# Patient Record
Sex: FEMALE | Race: WHITE | Employment: OTHER | URBAN - METROPOLITAN AREA
[De-identification: names, ages, dates, MRNs, and addresses within clinical notes are randomized per-mention and may not be internally consistent; named-entity substitution may affect disease eponyms.]

---

## 2013-12-27 LAB — COLONOSCOPY, EXTERNAL: NORMAL

## 2017-03-07 ENCOUNTER — OFFICE VISIT (OUTPATIENT)
Dept: FAMILY MEDICINE CLINIC | Age: 57
End: 2017-03-07

## 2017-03-07 VITALS
BODY MASS INDEX: 35.56 KG/M2 | WEIGHT: 193.25 LBS | DIASTOLIC BLOOD PRESSURE: 82 MMHG | OXYGEN SATURATION: 96 % | HEIGHT: 62 IN | HEART RATE: 73 BPM | SYSTOLIC BLOOD PRESSURE: 122 MMHG | RESPIRATION RATE: 16 BRPM | TEMPERATURE: 98 F

## 2017-03-07 DIAGNOSIS — Z00.00 WELL ADULT EXAM: Primary | ICD-10-CM

## 2017-03-07 DIAGNOSIS — Z11.59 NEED FOR HEPATITIS C SCREENING TEST: ICD-10-CM

## 2017-03-07 DIAGNOSIS — Z91.09 POLLEN ALLERGIES: ICD-10-CM

## 2017-03-07 DIAGNOSIS — I10 ESSENTIAL HYPERTENSION: ICD-10-CM

## 2017-03-07 DIAGNOSIS — F32.A ANXIETY AND DEPRESSION: ICD-10-CM

## 2017-03-07 DIAGNOSIS — F41.9 ANXIETY AND DEPRESSION: ICD-10-CM

## 2017-03-07 RX ORDER — AMLODIPINE AND BENAZEPRIL HYDROCHLORIDE 10; 20 MG/1; MG/1
CAPSULE ORAL
Qty: 90 CAP | Refills: 3 | Status: SHIPPED | OUTPATIENT
Start: 2017-03-07 | End: 2017-10-17 | Stop reason: SINTOL

## 2017-03-07 RX ORDER — HYDROCHLOROTHIAZIDE 12.5 MG/1
CAPSULE ORAL
Qty: 90 CAP | Refills: 3 | Status: SHIPPED | OUTPATIENT
Start: 2017-03-07 | End: 2018-01-22 | Stop reason: SDUPTHER

## 2017-03-07 RX ORDER — ESCITALOPRAM OXALATE 20 MG/1
TABLET ORAL
Qty: 90 TAB | Refills: 3 | Status: SHIPPED | OUTPATIENT
Start: 2017-03-07 | End: 2018-01-22 | Stop reason: SDUPTHER

## 2017-03-07 RX ORDER — LEVOCETIRIZINE DIHYDROCHLORIDE 5 MG/1
5 TABLET, FILM COATED ORAL DAILY
Qty: 90 TAB | Refills: 3 | Status: SHIPPED | OUTPATIENT
Start: 2017-03-07 | End: 2019-10-17 | Stop reason: ALTCHOICE

## 2017-03-07 RX ORDER — CLONAZEPAM 0.5 MG/1
TABLET ORAL
Qty: 60 TAB | Refills: 2 | Status: SHIPPED | OUTPATIENT
Start: 2017-03-07 | End: 2017-10-17 | Stop reason: SDUPTHER

## 2017-03-07 NOTE — PROGRESS NOTES
Subjective:   61 y.o. female for Well Woman Check. Her gyne and breast care is done elsewhere by her Ob-Gyne physician. 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 90 Tab 3    clonazePAM (KLONOPIN) 0.5 mg tablet TAKE 1 TABLET BY MOUTH TWICE A DAY MX OF 1 MG PER DAY 60 Tab 2    MILK THISTLE PO Take  by mouth.  aspirin 81 mg chewable tablet Take 81 mg by mouth daily.  conj estrogens-bazedoxifene (DUAVEE) 0.45-20 mg tab Take 1 Tab by mouth daily. 90 Tab 3    multivitamin (ONE A DAY) tablet Take 1 Tab by mouth daily.  psyllium (METAMUCIL SMOOTH TEXTURE) 28 % packet Take  by mouth two (2) times a day.  omega-3 fatty acids-vitamin e (FISH OIL) 1,000 mg Cap Take  by mouth. Family History   Problem Relation Age of Onset    Heart Disease Mother     Heart Disease Paternal Grandmother     Heart Disease Paternal Grandfather      Social History   Substance Use Topics    Smoking status: Current Every Day Smoker     Packs/day: 0.50     Types: Cigarettes    Smokeless tobacco: Never Used    Alcohol use Yes             ROS: Feeling generally well. No TIA's or unusual headaches, no dysphagia. No prolonged cough. No dyspnea or chest pain on exertion. No abdominal pain, change in bowel habits, black or bloody stools. No urinary tract symptoms. No new or unusual musculoskeletal symptoms.     Specific concerns today: none, needs refills sent to mail order. Objective: The patient appears well, alert, oriented x 3, in no distress. Visit Vitals    /82    Pulse 73    Temp 98 °F (36.7 °C) (Oral)    Resp 16    Ht 5' 2\" (1.575 m)    Wt 193 lb 4 oz (87.7 kg)    LMP 07/12/2010    SpO2 96%    BMI 35.35 kg/m2     ENT normal.  Neck supple. No adenopathy or thyromegaly. ISMA. Lungs are clear, good air entry, no wheezes, rhonchi or rales. S1 and S2 normal, no murmurs, regular rate and rhythm. Abdomen soft without tenderness, guarding, mass or organomegaly. Extremities show no edema, normal peripheral pulses. Neurological is normal, no focal findings. Breast and Pelvic exams are deferred. Assessment/Plan:   Well Woman  lose weight, increase physical activity, follow low fat diet, follow low salt diet, routine labs ordered  Encounter Diagnoses   Name Primary?  Well adult exam Yes    Essential hypertension     Need for hepatitis C screening test     Pollen allergies     Anxiety and depression      Orders Placed This Encounter    CBC WITH AUTOMATED DIFF    LIPID PANEL    METABOLIC PANEL, COMPREHENSIVE    TSH 3RD GENERATION    HEPATITIS C AB    amLODIPine-benazepril (LOTREL) 10-20 mg per capsule    hydroCHLOROthiazide (MICROZIDE) 12.5 mg capsule    escitalopram oxalate (LEXAPRO) 20 mg tablet    levocetirizine (XYZAL) 5 mg tablet    clonazePAM (KLONOPIN) 0.5 mg tablet     Labs and refills updated  Recheck 6 mo  I have discussed the diagnosis with the patient and the intended plan as seen in the above orders. The patient has received an after-visit summary and questions were answered concerning future plans. Patient conveyed understanding of the plan at the time of the visit.     Carter Ventura, MSN, ANP  3/7/2017

## 2017-03-07 NOTE — MR AVS SNAPSHOT
Visit Information Date & Time Provider Department Dept. Phone Encounter #  
 3/7/2017  8:30 AM Lexy Aquino, NP [address] [facility] [phone] [id number] Upcoming Health Maintenance Date Due Hepatitis C Screening 1960 Pneumococcal 19-64 Medium Risk (1 of 1 - PPSV23) 11/25/1978 BREAST CANCER SCRN MAMMOGRAM 12/24/2015 INFLUENZA AGE 9 TO ADULT 8/1/2016 PAP AKA CERVICAL CYTOLOGY 11/3/2017 DTaP/Tdap/Td series (2 - Td) 1/20/2022 COLONOSCOPY 12/30/2023 Allergies as of 3/7/2017  Review Complete On: 3/7/2017 By: Merrick Chicas LPN Severity Noted Reaction Type Reactions Darvon [Propoxyphene]  04/26/2010    Palpitations Current Immunizations  Reviewed on 5/24/2016 Name Date Influenza Vaccine Marlys Goody) 10/7/2015 TDAP Vaccine 1/20/2012 Not reviewed this visit You Were Diagnosed With   
  
 Codes Comments Well adult exam    -  Primary ICD-10-CM: Z00.00 ICD-9-CM: V70.0 Essential hypertension     ICD-10-CM: I10 
ICD-9-CM: 401.9 Need for hepatitis C screening test     ICD-10-CM: Z11.59 
ICD-9-CM: V73.89 Pollen allergies     ICD-10-CM: J30.1 ICD-9-CM: 477.0 Anxiety and depression     ICD-10-CM: F41.9, F32.9 ICD-9-CM: 300.00, 311 Vitals BP Pulse Temp Resp Height(growth percentile) Weight(growth percentile) 122/82 73 98 °F (36.7 °C) (Oral) 16 5' 2\" (1.575 m) 193 lb 4 oz (87.7 kg) LMP SpO2 BMI OB Status Smoking Status 07/12/2010 96% 35.35 kg/m2 Postmenopausal Current Every Day Smoker Vitals History BMI and BSA Data Body Mass Index Body Surface Area  
 35.35 kg/m 2 1.96 m 2 Preferred Pharmacy Pharmacy Name Phone [address] [place] [address], [facility] [phone] Your Updated Medication List  
  
   
This list is accurate as of: 3/7/17  9:18 AM.  Always use your most recent med list.  
  
  
  
  
 amLODIPine-benazepril 10-20 mg per capsule Commonly known as:  LOTREL  
TAKE 1 CAPSULE DAILY  
  
 aspirin 81 mg chewable tablet Take 81 mg by mouth daily. clonazePAM 0.5 mg tablet Commonly known as:  KlonoPIN  
TAKE 1 TABLET BY MOUTH TWICE A DAY MX OF 1 MG PER DAY  
  
 conj estrogens-bazedoxifene 0.45-20 mg Tab Commonly known as:  Saint Lou Take 1 Tab by mouth daily. escitalopram oxalate 20 mg tablet Commonly known as:  Carol Tin TAKE 1 TABLET DAILY FISH OIL 1,000 mg Cap Generic drug:  omega-3 fatty acids-vitamin e Take  by mouth. hydroCHLOROthiazide 12.5 mg capsule Commonly known as:  King Shorts TAKE 1 CAPSULE DAILY  
  
 levocetirizine 5 mg tablet Commonly known as:  Graylon Derrick Take 1 Tab by mouth daily. METAMUCIL SMOOTH TEXTURE packet Generic drug:  psyllium Take  by mouth two (2) times a day. MILK THISTLE PO Take  by mouth.  
  
 multivitamin tablet Commonly known as:  ONE A DAY Take 1 Tab by mouth daily. Prescriptions Printed Refills  
 clonazePAM (KLONOPIN) 0.5 mg tablet 2 Sig: TAKE 1 TABLET BY MOUTH TWICE A DAY MX OF 1 MG PER DAY Class: Print Prescriptions Sent to Pharmacy Refills  
 amLODIPine-benazepril (LOTREL) 10-20 mg per capsule 3 Sig: TAKE 1 CAPSULE DAILY Class: Normal  
 Pharmacy: [address] [place] [address] Ph #: [phone]  
 hydroCHLOROthiazide (MICROZIDE) 12.5 mg capsule 3 Sig: TAKE 1 CAPSULE DAILY Class: Normal  
 Pharmacy: [address] [place] [address] Ph #: [phone]  
 escitalopram oxalate (LEXAPRO) 20 mg tablet 3 Sig: TAKE 1 TABLET DAILY Class: Normal  
 Pharmacy: [address] [place] [address] Ph #: [phone]  
 levocetirizine (XYZAL) 5 mg tablet 3 Sig: Take 1 Tab by mouth daily.   
 Class: Normal  
 Pharmacy: [id number]  #: [phone] Route: Oral  
  
We Performed the Following CBC WITH AUTOMATED DIFF [01798 CPT(R)] HEPATITIS C AB [47125 CPT(R)] LIPID PANEL [50355 CPT(R)] METABOLIC PANEL, COMPREHENSIVE [27169 CPT(R)] TSH 3RD GENERATION [53996 CPT(R)] Introducing \[facility] [place] [facility]\"" & [facility] SERVICES! Dear Radha Henry: 
Thank you for requesting a wali account. Our records indicate that you already have an active wali account. You can access your account anytime at [email or web address] Did you know that you can access your hospital and ER discharge instructions at any time in wali? You can also review all of your test results from your hospital stay or ER visit. Additional Information If you have questions, please visit the Frequently Asked Questions section of the wali website at [email or web address] Remember, wali is NOT to be used for urgent needs. For medical emergencies, dial 911. Now available from your iPhone and Android! Please provide this summary of care documentation to your next provider. Your primary care clinician is listed as Kvng Montgomery. If you have any questions after today's visit, please call [phone].

## 2017-03-07 NOTE — PROGRESS NOTES
1. Have you been to the ER, urgent care clinic since your last visit? Hospitalized since your last visit? No    2. Have you seen or consulted any other health care providers outside of the [facility] since your last visit? Include any pap smears or colon screening.  No    Chief Complaint   Patient presents with    Follow-up    Labs     fasting

## 2017-03-08 LAB
ALBUMIN SERPL-MCNC: 4.2 G/DL (ref 3.5–5.5)
ALBUMIN/GLOB SERPL: 1.6 {RATIO} (ref 1.1–2.5)
ALP SERPL-CCNC: 89 IU/L (ref 39–117)
ALT SERPL-CCNC: 62 IU/L (ref 0–32)
AST SERPL-CCNC: 48 IU/L (ref 0–40)
BASOPHILS # BLD AUTO: 0.1 X10E3/UL (ref 0–0.2)
BASOPHILS NFR BLD AUTO: 1 %
BILIRUB SERPL-MCNC: 0.2 MG/DL (ref 0–1.2)
BUN SERPL-MCNC: 11 MG/DL (ref 6–24)
BUN/CREAT SERPL: 21 (ref 9–23)
CALCIUM SERPL-MCNC: 8.9 MG/DL (ref 8.7–10.2)
CHLORIDE SERPL-SCNC: 101 MMOL/L (ref 96–106)
CHOLEST SERPL-MCNC: 192 MG/DL (ref 100–199)
CO2 SERPL-SCNC: 25 MMOL/L (ref 18–29)
CREAT SERPL-MCNC: 0.52 MG/DL (ref 0.57–1)
EOSINOPHIL # BLD AUTO: 0.2 X10E3/UL (ref 0–0.4)
EOSINOPHIL NFR BLD AUTO: 2 %
ERYTHROCYTE [DISTWIDTH] IN BLOOD BY AUTOMATED COUNT: 12.9 % (ref 12.3–15.4)
GLOBULIN SER CALC-MCNC: 2.6 G/DL (ref 1.5–4.5)
GLUCOSE SERPL-MCNC: 92 MG/DL (ref 65–99)
HCT VFR BLD AUTO: 40.7 % (ref 34–46.6)
HCV AB S/CO SERPL IA: <0.1 S/CO RATIO (ref 0–0.9)
HDLC SERPL-MCNC: 70 MG/DL
HGB BLD-MCNC: 13.8 G/DL (ref 11.1–15.9)
IMM GRANULOCYTES # BLD: 0 X10E3/UL (ref 0–0.1)
IMM GRANULOCYTES NFR BLD: 0 %
INTERPRETATION, 910389: NORMAL
LDLC SERPL CALC-MCNC: 106 MG/DL (ref 0–99)
LYMPHOCYTES # BLD AUTO: 3 X10E3/UL (ref 0.7–3.1)
LYMPHOCYTES NFR BLD AUTO: 39 %
MCH RBC QN AUTO: 33.3 PG (ref 26.6–33)
MCHC RBC AUTO-ENTMCNC: 33.9 G/DL (ref 31.5–35.7)
MCV RBC AUTO: 98 FL (ref 79–97)
MONOCYTES # BLD AUTO: 0.6 X10E3/UL (ref 0.1–0.9)
MONOCYTES NFR BLD AUTO: 8 %
NEUTROPHILS # BLD AUTO: 3.9 X10E3/UL (ref 1.4–7)
NEUTROPHILS NFR BLD AUTO: 50 %
PLATELET # BLD AUTO: 374 X10E3/UL (ref 150–379)
POTASSIUM SERPL-SCNC: 4.3 MMOL/L (ref 3.5–5.2)
PROT SERPL-MCNC: 6.8 G/DL (ref 6–8.5)
RBC # BLD AUTO: 4.14 X10E6/UL (ref 3.77–5.28)
SODIUM SERPL-SCNC: 140 MMOL/L (ref 134–144)
TRIGL SERPL-MCNC: 82 MG/DL (ref 0–149)
TSH SERPL DL<=0.005 MIU/L-ACNC: 2.23 UIU/ML (ref 0.45–4.5)
VLDLC SERPL CALC-MCNC: 16 MG/DL (ref 5–40)
WBC # BLD AUTO: 7.8 X10E3/UL (ref 3.4–10.8)

## 2017-03-09 RX ORDER — MOMETASONE FUROATE 50 UG/1
SPRAY, METERED NASAL
Qty: 1 CONTAINER | Refills: 5 | Status: SHIPPED | OUTPATIENT
Start: 2017-03-09 | End: 2017-08-09 | Stop reason: ALTCHOICE

## 2017-03-09 NOTE — PROGRESS NOTES
Hey there, your labs overall look great, thyroid and cholesterol are in good range. Your liver functions are slightly high, watch the tylenol and alcohol intake.   We will recheck this in 6 months fasting, Eleni

## 2017-05-24 ENCOUNTER — OFFICE VISIT (OUTPATIENT)
Dept: FAMILY MEDICINE CLINIC | Age: 57
End: 2017-05-24

## 2017-05-24 VITALS
DIASTOLIC BLOOD PRESSURE: 74 MMHG | HEIGHT: 62 IN | BODY MASS INDEX: 35.55 KG/M2 | WEIGHT: 193.2 LBS | RESPIRATION RATE: 19 BRPM | HEART RATE: 75 BPM | TEMPERATURE: 98.5 F | SYSTOLIC BLOOD PRESSURE: 137 MMHG | OXYGEN SATURATION: 98 %

## 2017-05-24 DIAGNOSIS — R79.89 ELEVATED LFTS: ICD-10-CM

## 2017-05-24 DIAGNOSIS — F10.10 ALCOHOL ABUSE, DAILY USE: Primary | ICD-10-CM

## 2017-05-24 NOTE — PROGRESS NOTES
Chief Complaint   Patient presents with    Labs    Fatty Liver    Alcohol Problem     Patient seen in office today tearful with spouse present for discussion regarding her drinking addiction and concerns with her fatty liver. Pt is drinking 6-10 beers per day. Subjective: (As above and below)     Chief Complaint   Patient presents with    Labs    Fatty Liver    Alcohol Problem     she is a 61y.o. year old female who presents for evaluation. Reviewed PmHx, RxHx, FmHx, SocHx, AllgHx and updated in chart. Review of Systems - negative except as listed above    Objective:     Vitals:    05/24/17 1111   BP: 137/74   Pulse: 75   Resp: 19   Temp: 98.5 °F (36.9 °C)   TempSrc: Oral   SpO2: 98%   Weight: 193 lb 3.2 oz (87.6 kg)   Height: 5' 2\" (1.575 m)     Physical Examination: General appearance - alert, well appearing, and in no distress  Mental status - normal mood, behavior, speech, dress, motor activity, and thought processes  Mouth - mucous membranes moist, pharynx normal without lesions  Chest - clear to auscultation, no wheezes, rales or rhonchi, symmetric air entry  Heart - normal rate, regular rhythm, normal S1, S2, no murmurs, rubs, clicks or gallops  Musculoskeletal - no joint tenderness, deformity or swelling  Extremities - peripheral pulses normal, no pedal edema, no clubbing or cyanosis    Assessment/ Plan:   1. Alcohol abuse, daily use  -discussed need to cut back or eliminate alcohol use    2. Elevated LFTs  -continue to monitor periodically     Follow-up Disposition: As needed  I have discussed the diagnosis with the patient and the intended plan as seen in the above orders. The patient has received an after-visit summary and questions were answered concerning future plans.      Medication Side Effects and Warnings were discussed with patient: yes  Patient Labs were reviewed: yes  Patient Past Records were reviewed:  yes    Elvin Lima M.D.

## 2017-05-24 NOTE — MR AVS SNAPSHOT
Visit Information Date & Time Provider Department Dept. Phone Encounter #  
 5/24/2017 11:10 AM Jennifer Toledo MD [address] [facility] [phone] [id number] Upcoming Health Maintenance Date Due Pneumococcal 19-64 Medium Risk (1 of 1 - PPSV23) 11/25/1978 INFLUENZA AGE 9 TO ADULT 8/1/2017 BREAST CANCER SCRN MAMMOGRAM 11/1/2018 PAP AKA CERVICAL CYTOLOGY 11/1/2019 DTaP/Tdap/Td series (2 - Td) 1/20/2022 COLONOSCOPY 12/30/2023 Allergies as of 5/24/2017  Review Complete On: 5/24/2017 By: Jennifer Toledo MD  
  
 Severity Noted Reaction Type Reactions Darvon [Propoxyphene]  04/26/2010    Palpitations Current Immunizations  Reviewed on 5/24/2016 Name Date Influenza Vaccine Maynor Bliss) 10/7/2015 TDAP Vaccine 1/20/2012 Not reviewed this visit You Were Diagnosed With   
  
 Codes Comments Alcohol abuse, daily use    -  Primary ICD-10-CM: F10.10 ICD-9-CM: 305.01 Elevated LFTs     ICD-10-CM: R94.5 ICD-9-CM: 790.6 Vitals BP Pulse Temp Resp Height(growth percentile) Weight(growth percentile)  
 137/74 (BP 1 Location: Right arm, BP Patient Position: Sitting) 75 98.5 °F (36.9 °C) (Oral) 19 5' 2\" (1.575 m) 193 lb 3.2 oz (87.6 kg) LMP SpO2 BMI OB Status Smoking Status 07/12/2010 98% 35.34 kg/m2 Postmenopausal Current Every Day Smoker Vitals History BMI and BSA Data Body Mass Index Body Surface Area  
 35.34 kg/m 2 1.96 m 2 [id number] [phone] Your Updated Medication List  
  
   
This list is accurate as of: 5/24/17 11:38 AM.  Always use your most recent med list. amLODIPine-benazepril 10-20 mg per capsule Commonly known as:  LOTREL  
TAKE 1 CAPSULE DAILY  
  
 aspirin 81 mg chewable tablet Take 81 mg by mouth daily. clonazePAM 0.5 mg tablet Commonly known as:  KlonoPIN  
TAKE 1 TABLET BY MOUTH TWICE A DAY MX OF 1 MG PER DAY  
  
 conj estrogens-bazedoxifene 0.45-20 mg Tab Commonly known as:  Camella Cindy Take 1 Tab by mouth daily. escitalopram oxalate 20 mg tablet Commonly known as:  Lelon Bible TAKE 1 TABLET DAILY FISH OIL 1,000 mg Cap Generic drug:  omega-3 fatty acids-vitamin e Take  by mouth. hydroCHLOROthiazide 12.5 mg capsule Commonly known as:  Mellette Pollen TAKE 1 CAPSULE DAILY  
  
 levocetirizine 5 mg tablet Commonly known as:  Johanny Erichsen Take 1 Tab by mouth daily. METAMUCIL SMOOTH TEXTURE packet Generic drug:  psyllium Take  by mouth two (2) times a day. MILK THISTLE PO Take  by mouth.  
  
 mometasone 50 mcg/actuation nasal spray Commonly known as:  NASONEX  
USE 2 SPRAYS IN EACH NOSTRIL DAILY  
  
 multivitamin tablet Commonly known as:  ONE A DAY Take 1 Tab by mouth daily. Introducing [facility] & HEALTH SERVICES! Dear Cristofer Oscar: 
Thank you for requesting a BurudaConcert account. Our records indicate that you already have an active BurudaConcert account. You can access your account anytime at [email or web address] Security Did you know that you can access your hospital and ER discharge instructions at any time in BurudaConcert? You can also review all of your test results from your hospital stay or ER visit. Additional Information If you have questions, please visit the Frequently Asked Questions section of the BurudaConcert website at [email or web address] Remember, BurudaConcert is NOT to be used for urgent needs. For medical emergencies, dial 911. Now available from your iPhone and Android! Please provide this summary of care documentation to your next provider. Your primary care clinician is listed as Sierra Peterson. If you have any questions after today's visit, please call [phone].

## 2017-05-24 NOTE — PROGRESS NOTES
Chief Complaint   Patient presents with    Labs    Fatty Liver    Alcohol Problem     Patient seen in office today tearful with spouse present for discussion regarding her drinking addiction and concerns with her fatty liver.

## 2017-08-09 ENCOUNTER — OFFICE VISIT (OUTPATIENT)
Dept: FAMILY MEDICINE CLINIC | Age: 57
End: 2017-08-09

## 2017-08-09 VITALS
SYSTOLIC BLOOD PRESSURE: 132 MMHG | TEMPERATURE: 98.2 F | HEIGHT: 62 IN | HEART RATE: 76 BPM | DIASTOLIC BLOOD PRESSURE: 83 MMHG | RESPIRATION RATE: 18 BRPM | BODY MASS INDEX: 36.25 KG/M2 | WEIGHT: 197 LBS | OXYGEN SATURATION: 98 %

## 2017-08-09 DIAGNOSIS — M54.2 NECK PAIN: ICD-10-CM

## 2017-08-09 DIAGNOSIS — J06.9 UPPER RESPIRATORY TRACT INFECTION, UNSPECIFIED TYPE: Primary | ICD-10-CM

## 2017-08-09 DIAGNOSIS — M25.551 RIGHT HIP PAIN: ICD-10-CM

## 2017-08-09 DIAGNOSIS — R35.0 URINARY FREQUENCY: ICD-10-CM

## 2017-08-09 LAB
BILIRUB UR QL STRIP: NEGATIVE
GLUCOSE UR-MCNC: NEGATIVE MG/DL
KETONES P FAST UR STRIP-MCNC: NEGATIVE MG/DL
PH UR STRIP: 6.5 [PH] (ref 4.6–8)
PROT UR QL STRIP: NEGATIVE MG/DL
SP GR UR STRIP: 1.01 (ref 1–1.03)
UA UROBILINOGEN AMB POC: NORMAL (ref 0.2–1)
URINALYSIS CLARITY POC: CLEAR
URINALYSIS COLOR POC: YELLOW
URINE BLOOD POC: NEGATIVE
URINE LEUKOCYTES POC: NEGATIVE
URINE NITRITES POC: NEGATIVE

## 2017-08-09 RX ORDER — PREDNISONE 10 MG/1
TABLET ORAL
Qty: 21 TAB | Refills: 0 | Status: SHIPPED | OUTPATIENT
Start: 2017-08-09 | End: 2018-10-01 | Stop reason: ALTCHOICE

## 2017-08-09 RX ORDER — BACLOFEN 10 MG/1
10 TABLET ORAL 3 TIMES DAILY
Qty: 90 TAB | Refills: 1 | Status: SHIPPED | OUTPATIENT
Start: 2017-08-09 | End: 2017-10-26 | Stop reason: SDUPTHER

## 2017-08-09 NOTE — PROGRESS NOTES
Pt here c/o ongoing pain in left side of neck and right hip x several years. Reports having difficulty sleeping due to pain. Also c/o cough x 3-4 weeks. States that cough has been productive with thick mucus.

## 2017-08-09 NOTE — PROGRESS NOTES
Pt here c/o ongoing pain in left side of neck and right hip x several years. Reports having difficulty sleeping due to pain. Also c/o cough x 3-4 weeks. States that cough has been productive with thick mucus. Pt reports that her neck has been a chronic issue. Pt reports that her hip has been bothering her more than usual, can not sleep on that side, can not stand for long periods of time. Pt has been taking Advil with some relief. Subjective: (As above and below)     Chief Complaint   Patient presents with    Shoulder Pain    Neck Pain    Cough     she is a 61y.o. year old female who presents for evaluation. Reviewed PmHx, RxHx, FmHx, SocHx, AllgHx and updated in chart. Review of Systems - negative except as listed above    Objective:     Vitals:    08/09/17 0753   BP: 132/83   Pulse: 76   Resp: 18   Temp: 98.2 °F (36.8 °C)   TempSrc: Oral   SpO2: 98%   Weight: 197 lb (89.4 kg)   Height: 5' 2\" (1.575 m)     Physical Examination: General appearance - alert, well appearing, and in no distress  Mental status - normal mood, behavior, speech, dress, motor activity, and thought processes  Eyes - pupils equal and reactive, extraocular eye movements intact  Mouth - mucous membranes moist, pharynx normal without lesions  Chest - clear to auscultation, no wheezes, rales or rhonchi, symmetric air entry  Heart - normal rate, regular rhythm, normal S1, S2, no murmurs, rubs, clicks or gallops  Musculoskeletal - left trapezius spasm with tenderness  -right hip pain from SI notch to lateral right hip, pain with abduction    Assessment/ Plan:   1. Upper respiratory tract infection, unspecified type  -prednisone as written, mucinex or delsym to help with symptoms    2. Right hip pain  -take medication as written, check x-rays  - XR HIP RT W OR WO PELV 2-3 VWS; Future  - predniSONE (STERAPRED DS) 10 mg dose pack; See administration instruction per 10mg dose pack  Dispense: 21 Tab;  Refill: 0  - baclofen (LIORESAL) 10 mg tablet; Take 1 Tab by mouth three (3) times daily. Dispense: 90 Tab; Refill: 1    3. Neck pain  - XR SPINE CERV 4 OR 5 V; Future     4. Urinary frequency  -UA WNL    Follow-up Disposition: As needed  I have discussed the diagnosis with the patient and the intended plan as seen in the above orders. The patient has received an after-visit summary and questions were answered concerning future plans.      Medication Side Effects and Warnings were discussed with patient: yes  Patient Labs were reviewed: yes  Patient Past Records were reviewed:  yes    Belem Villarreal M.D.

## 2017-08-09 NOTE — MR AVS SNAPSHOT
Visit Information Date & Time Provider Department Dept. Phone Encounter #  
 8/9/2017  7:45 AM Mylene Ramos MD [address] [facility] [phone] [id number] Upcoming Health Maintenance Date Due Pneumococcal 19-64 Medium Risk (1 of 1 - PPSV23) 11/25/1979 INFLUENZA AGE 9 TO ADULT 8/1/2017 BREAST CANCER SCRN MAMMOGRAM 11/1/2018 PAP AKA CERVICAL CYTOLOGY 11/1/2019 DTaP/Tdap/Td series (2 - Td) 1/20/2022 COLONOSCOPY 12/30/2023 Allergies as of 8/9/2017  Review Complete On: 8/9/2017 By: Mylene Ramos MD  
  
 Severity Noted Reaction Type Reactions Darvon [Propoxyphene]  04/26/2010    Palpitations Current Immunizations  Reviewed on 5/24/2016 Name Date Influenza Vaccine Jesús Cm) 10/7/2015 TDAP Vaccine 1/20/2012 Not reviewed this visit You Were Diagnosed With   
  
 Codes Comments Upper respiratory tract infection, unspecified type    -  Primary ICD-10-CM: J06.9 ICD-9-CM: 465.9 Right hip pain     ICD-10-CM: M25.551 ICD-9-CM: 719.45 Neck pain     ICD-10-CM: M54.2 ICD-9-CM: 723.1 Urinary frequency     ICD-10-CM: R35.0 ICD-9-CM: 788.41 Vitals BP Pulse Temp Resp Height(growth percentile) Weight(growth percentile) 132/83 (BP 1 Location: Left arm, BP Patient Position: Sitting) 76 98.2 °F (36.8 °C) (Oral) 18 5' 2\" (1.575 m) 197 lb (89.4 kg) LMP SpO2 BMI OB Status Smoking Status 07/12/2010 98% 36.03 kg/m2 Postmenopausal Current Every Day Smoker Vitals History BMI and BSA Data Body Mass Index Body Surface Area 36.03 kg/m 2 1.98 m 2 Preferred Pharmacy [facility] Name Phone [id number], [address] [facility] [phone] Your Updated Medication List  
  
   
This list is accurate as of: 8/9/17  8:45 AM.  Always use your most recent med list. amLODIPine-benazepril 10-20 mg per capsule Commonly known as:  Iam Sill  
 TAKE 1 CAPSULE DAILY  
  
 aspirin 81 mg chewable tablet Take 81 mg by mouth daily. baclofen 10 mg tablet Commonly known as:  LIORESAL Take 1 Tab by mouth three (3) times daily. clonazePAM 0.5 mg tablet Commonly known as:  KlonoPIN  
TAKE 1 TABLET BY MOUTH TWICE A DAY MX OF 1 MG PER DAY  
  
 conj estrogens-bazedoxifene 0.45-20 mg Tab Commonly known as:  Adore Sins Take 1 Tab by mouth daily. escitalopram oxalate 20 mg tablet Commonly known as:  Cassandra Li TAKE 1 TABLET DAILY FISH OIL 1,000 mg Cap Generic drug:  omega-3 fatty acids-vitamin e Take  by mouth. hydroCHLOROthiazide 12.5 mg capsule Commonly known as:  Lindsey Harpreet TAKE 1 CAPSULE DAILY  
  
 levocetirizine 5 mg tablet Commonly known as:  Kaushik Jarad Take 1 Tab by mouth daily. METAMUCIL SMOOTH TEXTURE packet Generic drug:  psyllium Take  by mouth two (2) times a day. MILK THISTLE PO Take  by mouth.  
  
 multivitamin tablet Commonly known as:  ONE A DAY Take 1 Tab by mouth daily. predniSONE 10 mg dose pack Commonly known as:  STERAPRED DS See administration instruction per 10mg dose pack Prescriptions Sent to Pharmacy Refills  
 predniSONE (STERAPRED DS) 10 mg dose pack 0 Sig: See administration instruction per 10mg dose pack Class: Normal  
 Pharmacy: [facility] [address] [place] [address] Ph #: [phone]  
 baclofen (LIORESAL) 10 mg tablet 1 Sig: Take 1 Tab by mouth three (3) times daily. Class: Normal  
 Pharmacy: [facility] [address] Ph #: [phone] Route: Oral  
  
We Performed the Following AMB POC URINALYSIS DIP STICK AUTO W/O MICRO [37974 CPT(R)] To-Do List   
 08/09/2017 Imaging:  XR HIP RT W OR WO PELV 2-3 VWS   
  
 08/09/2017 Imaging:  XR SPINE CERV 4 OR 5 V [facility]! Dear David Dos Santos: Thank you for requesting a ReClaims account. Our records indicate that you already have an active ReClaims account. You can access your account anytime at [email or web address] [email or web address] Did you know that you can access your hospital and ER discharge instructions at any time in ReClaims? You can also review all of your test results from your hospital stay or ER visit. Additional Information If you have questions, please visit the Frequently Asked Questions section of the ReClaims website at [email or web address] Remember, ReClaims is NOT to be used for urgent needs. For medical emergencies, dial 911. Now available from your iPhone and Android! Please provide this summary of care documentation to your next provider. Your primary care clinician is listed as Vianey Sanchez. If you have any questions after today's visit, please call [phone].

## 2017-08-16 ENCOUNTER — HOSPITAL ENCOUNTER (OUTPATIENT)
Dept: GENERAL RADIOLOGY | Age: 57
Discharge: HOME | End: 2017-08-16
Attending: FAMILY MEDICINE
Payer: COMMERCIAL

## 2017-08-16 DIAGNOSIS — M54.2 NECK PAIN: ICD-10-CM

## 2017-08-16 DIAGNOSIS — M25.551 RIGHT HIP PAIN: ICD-10-CM

## 2017-08-16 PROCEDURE — 72050 X-RAY EXAM NECK SPINE 4/5VWS: CPT

## 2017-08-16 PROCEDURE — 73502 X-RAY EXAM HIP UNI 2-3 VIEWS: CPT

## 2017-08-16 NOTE — PROGRESS NOTES
Patient informed of results and providers recommendations. Patient declined suggest referral, [facility]  Patient informed to please call back with time and date of appointment and if referral is needed

## 2017-08-16 NOTE — PROGRESS NOTES
Mild to moderate arthritis, recommend PT to help with symptoms.    Please inform  Refer to Betty Bangura if a referral is needed

## 2017-10-17 ENCOUNTER — OFFICE VISIT (OUTPATIENT)
Dept: FAMILY MEDICINE CLINIC | Age: 57
End: 2017-10-17

## 2017-10-17 VITALS
SYSTOLIC BLOOD PRESSURE: 142 MMHG | TEMPERATURE: 98.8 F | HEIGHT: 62 IN | BODY MASS INDEX: 36.29 KG/M2 | WEIGHT: 197.2 LBS | DIASTOLIC BLOOD PRESSURE: 78 MMHG | HEART RATE: 73 BPM | OXYGEN SATURATION: 94 % | RESPIRATION RATE: 18 BRPM

## 2017-10-17 DIAGNOSIS — M25.551 RIGHT HIP PAIN: ICD-10-CM

## 2017-10-17 DIAGNOSIS — I10 ESSENTIAL HYPERTENSION: Primary | ICD-10-CM

## 2017-10-17 DIAGNOSIS — F41.9 ANXIETY AND DEPRESSION: ICD-10-CM

## 2017-10-17 DIAGNOSIS — R05.8 DRY COUGH: ICD-10-CM

## 2017-10-17 DIAGNOSIS — F32.A ANXIETY AND DEPRESSION: ICD-10-CM

## 2017-10-17 RX ORDER — CLONAZEPAM 0.5 MG/1
TABLET ORAL
Qty: 60 TAB | Refills: 2 | Status: SHIPPED | OUTPATIENT
Start: 2017-10-17 | End: 2018-05-29 | Stop reason: SDUPTHER

## 2017-10-17 RX ORDER — AMLODIPINE AND VALSARTAN 10; 320 MG/1; MG/1
1 TABLET ORAL DAILY
Qty: 90 TAB | Refills: 3 | Status: SHIPPED | OUTPATIENT
Start: 2017-10-17 | End: 2018-10-12 | Stop reason: SDUPTHER

## 2017-10-17 NOTE — PROGRESS NOTES
Chief Complaint   Patient presents with    Hip Pain     5/10     Patient seen in the office today with spouse for unresolved hip pain 5/10  Pt had not started PT due to confusion re: referral.     Subjective: (As above and below)     Chief Complaint   Patient presents with    Hip Pain     5/10     she is a 61y.o. year old female who presents for evaluation. Reviewed PmHx, RxHx, FmHx, SocHx, AllgHx and updated in chart. Review of Systems - negative except as listed above    Objective:     Vitals:    10/17/17 0942   BP: 142/78   Pulse: 73   Resp: 18   Temp: 98.8 °F (37.1 °C)   TempSrc: Oral   SpO2: 94%   Weight: 197 lb 3.2 oz (89.4 kg)   Height: 5' 2\" (1.575 m)     Physical Examination: General appearance - alert, well appearing, and in no distress  Mental status - normal mood, behavior, speech, dress, motor activity, and thought processes  Mouth - mucous membranes moist, pharynx normal without lesions  Chest - clear to auscultation, no wheezes, rales or rhonchi, symmetric air entry  Heart - normal rate, regular rhythm, normal S1, S2, no murmurs, rubs, clicks or gallops  Musculoskeletal - right hip pain with ad/abduction  Extremities - peripheral pulses normal, no pedal edema, no clubbing or cyanosis    Assessment/ Plan:   1. Essential hypertension  -change due to dry cough  - amLODIPine-valsartan (EXFORGE)  mg per tablet; Take 1 Tab by mouth daily. Dispense: 90 Tab; Refill: 3    2. Right hip pain  - REFERRAL TO PHYSICAL THERAPY    3. Dry cough  - amLODIPine-valsartan (EXFORGE)  mg per tablet; Take 1 Tab by mouth daily. Dispense: 90 Tab; Refill: 3    4. Anxiety and depression  -well controlled, refilled x 3 motnhs  - clonazePAM (KLONOPIN) 0.5 mg tablet; TAKE 1 TABLET BY MOUTH TWICE A DAY MX OF 1 MG PER DAY  Dispense: 60 Tab; Refill: 2     Follow-up Disposition: As needed  I have discussed the diagnosis with the patient and the intended plan as seen in the above orders.   The patient has received an after-visit summary and questions were answered concerning future plans.      Medication Side Effects and Warnings were discussed with patient: yes  Patient Labs were reviewed: yes  Patient Past Records were reviewed:  yes    Sushil Wetzel M.D.

## 2017-10-17 NOTE — PROGRESS NOTES
Chief Complaint   Patient presents with    Hip Pain     5/10     Patient seen in the office today with spouse for unresolved hip pain 5/10

## 2017-10-17 NOTE — MR AVS SNAPSHOT
Visit Information Date & Time Provider Department Dept. Phone Encounter #  
 10/17/2017  9:35 AM Juice Martines MD [address] [facility] [phone] [id number] Upcoming Health Maintenance Date Due Pneumococcal 19-64 Medium Risk (1 of 1 - PPSV23) 11/25/1978 INFLUENZA AGE 9 TO ADULT 8/1/2017 BREAST CANCER SCRN MAMMOGRAM 11/1/2018 PAP AKA CERVICAL CYTOLOGY 11/1/2019 DTaP/Tdap/Td series (2 - Td) 1/20/2022 COLONOSCOPY 12/30/2023 Allergies as of 10/17/2017  Review Complete On: 10/17/2017 By: Juice Martines MD  
  
 Severity Noted Reaction Type Reactions Darvon [Propoxyphene]  04/26/2010    Palpitations Current Immunizations  Reviewed on 5/24/2016 Name Date Influenza Vaccine Carlton Pasha) 10/7/2015 TDAP Vaccine 1/20/2012 Not reviewed this visit You Were Diagnosed With   
  
 Codes Comments Essential hypertension    -  Primary ICD-10-CM: I10 
ICD-9-CM: 401.9 Right hip pain     ICD-10-CM: M25.551 ICD-9-CM: 719.45 Dry cough     ICD-10-CM: R05 ICD-9-CM: 786.2 Anxiety and depression     ICD-10-CM: F41.8 ICD-9-CM: 300.00, 311 Vitals BP Pulse Temp Resp Height(growth percentile) Weight(growth percentile) 142/78 (BP 1 Location: Left arm, BP Patient Position: Sitting) 73 98.8 °F (37.1 °C) (Oral) 18 5' 2\" (1.575 m) 197 lb 3.2 oz (89.4 kg) LMP SpO2 BMI OB Status Smoking Status 07/12/2010 94% 36.07 kg/m2 Postmenopausal Current Every Day Smoker Vitals History BMI and BSA Data Body Mass Index Body Surface Area 36.07 kg/m 2 1.98 m 2 Preferred Pharmacy Pharmacy Name Phone [id number], [address] [facility] [phone] Your Updated Medication List  
  
   
This list is accurate as of: 10/17/17 10:26 AM.  Always use your most recent med list. amLODIPine-valsartan  mg per tablet Commonly known as:  Melissa Waller  
 Take 1 Tab by mouth daily. aspirin 81 mg chewable tablet Take 81 mg by mouth daily. baclofen 10 mg tablet Commonly known as:  LIORESAL Take 1 Tab by mouth three (3) times daily. clonazePAM 0.5 mg tablet Commonly known as:  KlonoPIN  
TAKE 1 TABLET BY MOUTH TWICE A DAY MX OF 1 MG PER DAY  
  
 conj estrogens-bazedoxifene 0.45-20 mg Tab Commonly known as:  Hillsdale Shaunna Take 1 Tab by mouth daily. escitalopram oxalate 20 mg tablet Commonly known as:  Jaime Cabrera TAKE 1 TABLET DAILY FISH OIL 1,000 mg Cap Generic drug:  omega-3 fatty acids-vitamin e Take  by mouth. hydroCHLOROthiazide 12.5 mg capsule Commonly known as:  Danyelle Coalfield TAKE 1 CAPSULE DAILY  
  
 levocetirizine 5 mg tablet Commonly known as:  Arleth Gull Take 1 Tab by mouth daily. METAMUCIL SMOOTH TEXTURE packet Generic drug:  psyllium Take  by mouth two (2) times a day. MILK THISTLE PO Take  by mouth.  
  
 multivitamin tablet Commonly known as:  ONE A DAY Take 1 Tab by mouth daily. predniSONE 10 mg dose pack Commonly known as:  STERAPRED DS See administration instruction per 10mg dose pack Prescriptions Printed Refills  
 clonazePAM (KLONOPIN) 0.5 mg tablet 2 Sig: TAKE 1 TABLET BY MOUTH TWICE A DAY MX OF 1 MG PER DAY Class: Print Prescriptions Sent to Pharmacy Refills  
 amLODIPine-valsartan (EXFORGE)  mg per tablet 3 Sig: Take 1 Tab by mouth daily. Class: Normal  
 Pharmacy: [facility] [address], [address] Ph #: [phone] Route: Oral  
  
We Performed the Following REFERRAL TO PHYSICAL THERAPY [[facility]14 Custom] Comments:  
 Please evaluate and treat for hip pain Referral Information Referral ID Referred By Referred To  
  
 [id number] Pooja BALDWIN Not Available Visits Status Start Date End Date 1 New Request 10/17/17 10/17/18 If your referral has a status of pending review or denied, additional information will be sent to support the outcome of this decision. Introducing [facility] SERVICES! Dear Hollie Davalos: 
Thank you for requesting a GBooking account. Our records indicate that you already have an active GBooking account. You can access your account anytime at [email or web address] Did you know that you can access your hospital and ER discharge instructions at any time in GBooking? You can also review all of your test results from your hospital stay or ER visit. Additional Information If you have questions, please visit the Frequently Asked Questions section of the GBooking website at [email or web address] Remember, GBooking is NOT to be used for urgent needs. For medical emergencies, dial 911. Now available from your iPhone and Android! Please provide this summary of care documentation to your next provider. Your primary care clinician is listed as Erick Guzman. If you have any questions after today's visit, please call [phone].

## 2017-10-26 DIAGNOSIS — M25.551 RIGHT HIP PAIN: ICD-10-CM

## 2017-10-27 RX ORDER — BACLOFEN 10 MG/1
TABLET ORAL
Qty: 90 TAB | Refills: 1 | Status: SHIPPED | OUTPATIENT
Start: 2017-10-27 | End: 2018-01-06 | Stop reason: SDUPTHER

## 2018-01-06 DIAGNOSIS — M25.551 RIGHT HIP PAIN: ICD-10-CM

## 2018-01-07 RX ORDER — BACLOFEN 10 MG/1
TABLET ORAL
Qty: 90 TAB | Refills: 1 | Status: SHIPPED | OUTPATIENT
Start: 2018-01-07 | End: 2018-05-17 | Stop reason: SDUPTHER

## 2018-01-22 RX ORDER — HYDROCHLOROTHIAZIDE 12.5 MG/1
CAPSULE ORAL
Qty: 90 CAP | Refills: 2 | Status: SHIPPED | OUTPATIENT
Start: 2018-01-22 | End: 2018-12-31 | Stop reason: SDUPTHER

## 2018-01-22 RX ORDER — ESCITALOPRAM OXALATE 20 MG/1
TABLET ORAL
Qty: 90 TAB | Refills: 2 | Status: SHIPPED | OUTPATIENT
Start: 2018-01-22 | End: 2018-12-31 | Stop reason: SDUPTHER

## 2018-05-17 DIAGNOSIS — M25.551 RIGHT HIP PAIN: ICD-10-CM

## 2018-05-17 RX ORDER — BACLOFEN 10 MG/1
TABLET ORAL
Qty: 90 TAB | Refills: 1 | Status: SHIPPED | OUTPATIENT
Start: 2018-05-17 | End: 2018-06-15 | Stop reason: SDUPTHER

## 2018-05-29 ENCOUNTER — OFFICE VISIT (OUTPATIENT)
Dept: FAMILY MEDICINE CLINIC | Age: 58
End: 2018-05-29

## 2018-05-29 VITALS
TEMPERATURE: 98.9 F | RESPIRATION RATE: 16 BRPM | OXYGEN SATURATION: 98 % | DIASTOLIC BLOOD PRESSURE: 70 MMHG | BODY MASS INDEX: 36.83 KG/M2 | HEIGHT: 62 IN | WEIGHT: 200.13 LBS | SYSTOLIC BLOOD PRESSURE: 110 MMHG | HEART RATE: 79 BPM

## 2018-05-29 DIAGNOSIS — H04.301 INFECTION OF RIGHT TEAR DUCT: ICD-10-CM

## 2018-05-29 DIAGNOSIS — F32.A ANXIETY AND DEPRESSION: ICD-10-CM

## 2018-05-29 DIAGNOSIS — B35.4 TINEA CORPORIS: ICD-10-CM

## 2018-05-29 DIAGNOSIS — F41.9 ANXIETY AND DEPRESSION: ICD-10-CM

## 2018-05-29 DIAGNOSIS — I10 ESSENTIAL HYPERTENSION: ICD-10-CM

## 2018-05-29 DIAGNOSIS — N39.3 FEMALE STRESS INCONTINENCE: ICD-10-CM

## 2018-05-29 DIAGNOSIS — G57.62 MORTON'S NEUROMA OF LEFT FOOT: ICD-10-CM

## 2018-05-29 DIAGNOSIS — Z00.00 WELL ADULT EXAM: Primary | ICD-10-CM

## 2018-05-29 RX ORDER — ESTRADIOL AND NORETHINDRONE ACETATE 1; .5 MG/1; MG/1
TABLET, FILM COATED ORAL
Refills: 1 | COMMUNITY
Start: 2018-04-03 | End: 2019-10-17 | Stop reason: ALTCHOICE

## 2018-05-29 RX ORDER — NYSTATIN AND TRIAMCINOLONE ACETONIDE 100000; 1 [USP'U]/G; MG/G
CREAM TOPICAL 2 TIMES DAILY
Qty: 30 G | Refills: 0 | Status: SHIPPED | OUTPATIENT
Start: 2018-05-29 | End: 2019-10-17 | Stop reason: ALTCHOICE

## 2018-05-29 RX ORDER — CIPROFLOXACIN HYDROCHLORIDE 3.5 MG/ML
1 SOLUTION/ DROPS TOPICAL
Qty: 10 ML | Refills: 0 | Status: SHIPPED | OUTPATIENT
Start: 2018-05-29 | End: 2018-05-31

## 2018-05-29 RX ORDER — CLONAZEPAM 0.5 MG/1
TABLET ORAL
Qty: 60 TAB | Refills: 2 | Status: SHIPPED | OUTPATIENT
Start: 2018-05-29 | End: 2019-10-17 | Stop reason: ALTCHOICE

## 2018-05-29 NOTE — MR AVS SNAPSHOT
[address] [place] [address] 
[phone] Patient: Galina Rosado 
MRN:  [id number] Visit Information Date & Time Provider Department Dept. Phone Encounter #  
 5/29/2018  9:15 AM Marcia Bansal NP [address] [facility] [phone] [id number] Upcoming Health Maintenance Date Due Pneumococcal 19-64 Medium Risk (1 of 1 - PPSV23) 11/25/1978 Influenza Age 5 to Adult 8/1/2018 BREAST CANCER SCRN MAMMOGRAM 11/1/2018 PAP AKA CERVICAL CYTOLOGY 11/1/2019 DTaP/Tdap/Td series (2 - Td) 1/20/2022 COLONOSCOPY 12/30/2023 Allergies as of 5/29/2018  Review Complete On: 5/29/2018 By: Bertin Baez LPN Severity Noted Reaction Type Reactions Darvon [Propoxyphene]  04/26/2010    Palpitations Current Immunizations  Reviewed on 5/24/2016 Name Date Influenza Vaccine Emely Whyte) 10/7/2015 TDAP Vaccine 1/20/2012 Not reviewed this visit You Were Diagnosed With   
  
 Codes Comments Well adult exam    -  Primary ICD-10-CM: Z00.00 ICD-9-CM: V70.0 Essential hypertension     ICD-10-CM: I10 
ICD-9-CM: 401.9 Infection of right tear duct     ICD-10-CM: H04.301 ICD-9-CM: 373.11 Tinea corporis     ICD-10-CM: B35.4 ICD-9-CM: 110.5 Mccord's neuroma of left foot     ICD-10-CM: G57.62 
ICD-9-CM: 355.6 Anxiety and depression     ICD-10-CM: F41.9, F32.9 ICD-9-CM: 300.00, 311 Female stress incontinence     ICD-10-CM: N39.3 ICD-9-CM: 625.6 Vitals BP Pulse Temp Resp Height(growth percentile) Weight(growth percentile) 110/70 79 98.9 °F (37.2 °C) (Oral) 16 5' 2\" (1.575 m) 200 lb 2 oz (90.8 kg) LMP SpO2 BMI OB Status Smoking Status 07/12/2010 98% 36.6 kg/m2 Postmenopausal Current Every Day Smoker Vitals History BMI and BSA Data Body Mass Index Body Surface Area  
 36.6 kg/m 2 1.99 m 2 Preferred Pharmacy Pharmacy Name Phone [id number], [address] [phone] Your Updated Medication List  
  
   
This list is accurate as of 5/29/18 10:01 AM.  Always use your most recent med list. amLODIPine-valsartan  mg per tablet Commonly known as:  Winstonsherly Bugler Take 1 Tab by mouth daily. aspirin 81 mg chewable tablet Take 81 mg by mouth daily. baclofen 10 mg tablet Commonly known as:  LIORESAL  
TAKE 1 TAB BY MOUTH THREE (3) TIMES DAILY. ciprofloxacin HCl 0.3 % ophthalmic solution Commonly known as:  Alfred Hum Administer 1 Drop to both eyes every two (2) hours for 2 days. Then every 4 hours for 4 days  
  
 clonazePAM 0.5 mg tablet Commonly known as:  KlonoPIN  
TAKE 1 TABLET BY MOUTH TWICE A DAY MX OF 1 MG PER DAY  
  
 conj estrogens-bazedoxifene 0.45-20 mg Tab Commonly known as:  Darlyn Derrick Take 1 Tab by mouth daily. escitalopram oxalate 20 mg tablet Commonly known as:  Levorn Allegra TAKE 1 TABLET BY MOUTH DAILY FISH OIL 1,000 mg Cap Generic drug:  omega-3 fatty acids-vitamin e Take  by mouth. FRANCOIS EXTRACT PO Take  by mouth. hydroCHLOROthiazide 12.5 mg capsule Commonly known as:  Carol Blazing TAKE 1 CAPSULE BY MOUTH DAILY  
  
 levocetirizine 5 mg tablet Commonly known as:  Marval Kitten Take 1 Tab by mouth daily. METAMUCIL SMOOTH TEXTURE packet Generic drug:  psyllium Take  by mouth two (2) times a day. MILK THISTLE PO Take  by mouth. MIMVEY 1-0.5 mg per tablet Generic drug:  estradiol-norethindrone TAKE 1 TABLET BY MOUTH EVERY DAY  
  
 mirabegron ER 25 mg ER tablet Commonly known as:  MYRBETRIQ Take 1 Tab by mouth daily. multivitamin tablet Commonly known as:  ONE A DAY Take 1 Tab by mouth daily. nystatin-triamcinolone topical cream  
Commonly known as:  MYCOLOG II Apply  to affected area two (2) times a day. predniSONE 10 mg dose pack Commonly known as:  STERAPRED DS  
 See administration instruction per 10mg dose pack TURMERIC ROOT EXTRACT PO Take  by mouth. ZyrTEC 10 mg Cap Generic drug:  Cetirizine Take  by mouth. Prescriptions Printed Refills  
 clonazePAM (KLONOPIN) 0.5 mg tablet 2 Sig: TAKE 1 TABLET BY MOUTH TWICE A DAY MX OF 1 MG PER DAY Class: Print Prescriptions Sent to Pharmacy Refills  
 ciprofloxacin HCl (CILOXAN) 0.3 % ophthalmic solution 0 Sig: Administer 1 Drop to both eyes every two (2) hours for 2 days. Then every 4 hours for 4 days Class: Normal  
 Pharmacy: [address] Ph #: [phone] Route: Both Eyes  
 nystatin-triamcinolone (MYCOLOG II) topical cream 0 Sig: Apply  to affected area two (2) times a day. Class: Normal  
 Pharmacy: [facility] Ph #: [phone] Route: Topical  
 mirabegron ER (MYRBETRIQ) 25 mg ER tablet 2 Sig: Take 1 Tab by mouth daily. Class: Normal  
 Pharmacy: [address] Ph #: [phone] Route: Oral  
  
We Performed the Following CBC WITH AUTOMATED DIFF [46907 CPT(R)] LIPID PANEL [07556 CPT(R)] METABOLIC PANEL, COMPREHENSIVE [58527 CPT(R)] REFERRAL TO ORTHOPEDIC SURGERY [REF62 Custom] TSH 3RD GENERATION [37996 CPT(R)] Referral Information Referral ID Referred By Referred To  
  
 [id number] [place], [address], [address] [facility]. [address] Phone: [phone] Fax: [phone] Visits Status Start Date End Date 1 New Request 5/29/18 5/29/19 If your referral has a status of pending review or denied, additional information will be sent to support the outcome of this decision. [facility] SERVICES! Dear Aline Andrew: 
Thank you for requesting a Oneexchangestreet account.   Our records indicate that you already have an active RetailNext account. You can access your account anytime at [email or web address] Did you know that you can access your hospital and ER discharge instructions at any time in RetailNext? You can also review all of your test results from your hospital stay or ER visit. Additional Information If you have questions, please visit the Frequently Asked Questions section of the RetailNext website at [email or web address] Remember, RetailNext is NOT to be used for urgent needs. For medical emergencies, dial 911. Now available from your iPhone and Android! Please provide this summary of care documentation to your next provider. Your primary care clinician is listed as Ijeoma Serrano. If you have any questions after today's visit, please call [phone].

## 2018-05-29 NOTE — PROGRESS NOTES
1. Have you been to the ER, urgent care clinic since your last visit? Hospitalized since your last visit? No    2. Have you seen or consulted any other health care providers outside of the [facility] since your last visit? Include any pap smears or colon screening.  No    Chief Complaint   Patient presents with    Follow-up    Labs     fasting

## 2018-05-30 LAB
ALBUMIN SERPL-MCNC: 4.4 G/DL (ref 3.5–5.5)
ALBUMIN/GLOB SERPL: 1.8 {RATIO} (ref 1.2–2.2)
ALP SERPL-CCNC: 83 IU/L (ref 39–117)
ALT SERPL-CCNC: 55 IU/L (ref 0–32)
AST SERPL-CCNC: 60 IU/L (ref 0–40)
BASOPHILS # BLD AUTO: 0 X10E3/UL (ref 0–0.2)
BASOPHILS NFR BLD AUTO: 0 %
BILIRUB SERPL-MCNC: 0.4 MG/DL (ref 0–1.2)
BUN SERPL-MCNC: 9 MG/DL (ref 6–24)
BUN/CREAT SERPL: 14 (ref 9–23)
CALCIUM SERPL-MCNC: 8.8 MG/DL (ref 8.7–10.2)
CHLORIDE SERPL-SCNC: 105 MMOL/L (ref 96–106)
CHOLEST SERPL-MCNC: 193 MG/DL (ref 100–199)
CO2 SERPL-SCNC: 21 MMOL/L (ref 18–29)
CREAT SERPL-MCNC: 0.65 MG/DL (ref 0.57–1)
EOSINOPHIL # BLD AUTO: 0.1 X10E3/UL (ref 0–0.4)
EOSINOPHIL NFR BLD AUTO: 2 %
ERYTHROCYTE [DISTWIDTH] IN BLOOD BY AUTOMATED COUNT: 12.8 % (ref 12.3–15.4)
GFR SERPLBLD CREATININE-BSD FMLA CKD-EPI: 113 ML/MIN/1.73
GFR SERPLBLD CREATININE-BSD FMLA CKD-EPI: 98 ML/MIN/1.73
GLOBULIN SER CALC-MCNC: 2.5 G/DL (ref 1.5–4.5)
GLUCOSE SERPL-MCNC: 97 MG/DL (ref 65–99)
HCT VFR BLD AUTO: 42.1 % (ref 34–46.6)
HDLC SERPL-MCNC: 69 MG/DL
HGB BLD-MCNC: 14.1 G/DL (ref 11.1–15.9)
IMM GRANULOCYTES # BLD: 0 X10E3/UL (ref 0–0.1)
IMM GRANULOCYTES NFR BLD: 0 %
INTERPRETATION, 910389: NORMAL
LDLC SERPL CALC-MCNC: 107 MG/DL (ref 0–99)
LYMPHOCYTES # BLD AUTO: 2.6 X10E3/UL (ref 0.7–3.1)
LYMPHOCYTES NFR BLD AUTO: 29 %
MCH RBC QN AUTO: 34.4 PG (ref 26.6–33)
MCHC RBC AUTO-ENTMCNC: 33.5 G/DL (ref 31.5–35.7)
MCV RBC AUTO: 103 FL (ref 79–97)
MONOCYTES # BLD AUTO: 0.8 X10E3/UL (ref 0.1–0.9)
MONOCYTES NFR BLD AUTO: 9 %
NEUTROPHILS # BLD AUTO: 5.4 X10E3/UL (ref 1.4–7)
NEUTROPHILS NFR BLD AUTO: 60 %
PLATELET # BLD AUTO: 365 X10E3/UL (ref 150–379)
POTASSIUM SERPL-SCNC: 4 MMOL/L (ref 3.5–5.2)
PROT SERPL-MCNC: 6.9 G/DL (ref 6–8.5)
RBC # BLD AUTO: 4.1 X10E6/UL (ref 3.77–5.28)
SODIUM SERPL-SCNC: 139 MMOL/L (ref 134–144)
TRIGL SERPL-MCNC: 86 MG/DL (ref 0–149)
TSH SERPL DL<=0.005 MIU/L-ACNC: 2.5 UIU/ML (ref 0.45–4.5)
VLDLC SERPL CALC-MCNC: 17 MG/DL (ref 5–40)
WBC # BLD AUTO: 9 X10E3/UL (ref 3.4–10.8)

## 2018-05-30 NOTE — PROGRESS NOTES
Subjective:   61 y.o. female for Well Woman Check. Her gyne and breast care is done elsewhere by her Ob-Gyne physician. Patient Active Problem List    Diagnosis Date Noted    Depressive disorder, not elsewhere classified 06/26/2013    HTN (hypertension) 05/16/2013    Migraines 04/26/2010    Mccord neuroma 04/26/2010    Anemia 04/26/2010     Current Outpatient Prescriptions   Medication Sig Dispense Refill    Cetirizine (ZYRTEC) 10 mg cap Take  by mouth.  MIMVEY 1-0.5 mg per tablet TAKE 1 TABLET BY MOUTH EVERY DAY  1    TURMERIC ROOT EXTRACT PO Take  by mouth.  hawa root (HAWA EXTRACT PO) Take  by mouth.  ciprofloxacin HCl (CILOXAN) 0.3 % ophthalmic solution Administer 1 Drop to both eyes every two (2) hours for 2 days. Then every 4 hours for 4 days 10 mL 0    nystatin-triamcinolone (MYCOLOG II) topical cream Apply  to affected area two (2) times a day. 30 g 0    clonazePAM (KLONOPIN) 0.5 mg tablet TAKE 1 TABLET BY MOUTH TWICE A DAY MX OF 1 MG PER DAY 60 Tab 2    mirabegron ER (MYRBETRIQ) 25 mg ER tablet Take 1 Tab by mouth daily. 30 Tab 2    baclofen (LIORESAL) 10 mg tablet TAKE 1 TAB BY MOUTH THREE (3) TIMES DAILY. 90 Tab 1    escitalopram oxalate (LEXAPRO) 20 mg tablet TAKE 1 TABLET BY MOUTH DAILY 90 Tab 2    hydroCHLOROthiazide (MICROZIDE) 12.5 mg capsule TAKE 1 CAPSULE BY MOUTH DAILY 90 Cap 2    amLODIPine-valsartan (EXFORGE)  mg per tablet Take 1 Tab by mouth daily. 90 Tab 3    aspirin 81 mg chewable tablet Take 81 mg by mouth daily.  multivitamin (ONE A DAY) tablet Take 1 Tab by mouth daily.  psyllium (METAMUCIL SMOOTH TEXTURE) 28 % packet Take  by mouth two (2) times a day.  omega-3 fatty acids-vitamin e (FISH OIL) 1,000 mg Cap Take  by mouth.  predniSONE (STERAPRED DS) 10 mg dose pack See administration instruction per 10mg dose pack 21 Tab 0    levocetirizine (XYZAL) 5 mg tablet Take 1 Tab by mouth daily.  90 Tab 3    MILK THISTLE PO Take by mouth.  conj estrogens-bazedoxifene (DUAVEE) 0.45-20 mg tab Take 1 Tab by mouth daily. 80 Tab 3     Family History   Problem Relation Age of Onset    Heart Disease Mother     Heart Disease Paternal Grandmother     Heart Disease Paternal Grandfather      Social History   Substance Use Topics    Smoking status: Current Every Day Smoker     Packs/day: 0.50     Types: Cigarettes    Smokeless tobacco: Never Used    Alcohol use Yes             ROS: Feeling generally well. No TIA's or unusual headaches, no dysphagia. No prolonged cough. No dyspnea or chest pain on exertion. No abdominal pain, change in bowel habits, black or bloody stools. No urinary tract symptoms. No new or unusual musculoskeletal symptoms. Specific concerns today: having rash of the chest and between the breasts that itches x 1 week, no new exposures, needs refill of her klonopin. Also complains of stress incont, tried vesicare in the past but did not help. Right tear duct is red, swollen and painful, sx x 3 days, no injury or foreign body. Objective: The patient appears well, alert, oriented x 3, in no distress. Visit Vitals    /70    Pulse 79    Temp 98.9 °F (37.2 °C) (Oral)    Resp 16    Ht 5' 2\" (1.575 m)    Wt 200 lb 2 oz (90.8 kg)    LMP 07/12/2010    SpO2 98%    BMI 36.6 kg/m2     ENT normal.  Neck supple. No adenopathy or thyromegaly. ISMA. Lungs are clear, good air entry, no wheezes, rhonchi or rales. S1 and S2 normal, no murmurs, regular rate and rhythm. Abdomen soft without tenderness, guarding, mass or organomegaly. Extremities show no edema, normal peripheral pulses. Neurological is normal, no focal findings. Breast and Pelvic exams are deferred. Assessment/Plan:   Well Woman  lose weight, increase physical activity, follow low fat diet, follow low salt diet, routine labs ordered  Encounter Diagnoses   Name Primary?     Well adult exam Yes    Essential hypertension     Infection of right tear duct     Tinea corporis     Mccord's neuroma of left foot     Anxiety and depression     Female stress incontinence      Orders Placed This Encounter    CBC WITH AUTOMATED DIFF    LIPID PANEL    METABOLIC PANEL, COMPREHENSIVE    TSH 3RD GENERATION    REFERRAL TO ORTHOPEDIC SURGERY    Cetirizine (ZYRTEC) 10 mg cap    MIMVEY 1-0.5 mg per tablet    TURMERIC ROOT EXTRACT PO    hawa root (HAWA EXTRACT PO)    ciprofloxacin HCl (CILOXAN) 0.3 % ophthalmic solution    nystatin-triamcinolone (MYCOLOG II) topical cream    clonazePAM (KLONOPIN) 0.5 mg tablet    mirabegron ER (MYRBETRIQ) 25 mg ER tablet     Given refills  Start Myrbetric daily for urinary sx  Start ciloxan opth soln for right tear duct infection, follow up 3-4 days if not improving    I have discussed the diagnosis with the patient and the intended plan as seen in the above orders. The patient has received an after-visit summary and questions were answered concerning future plans. Patient conveyed understanding of the plan at the time of the visit.     Gibson Hodges, MSN, ANP  5/29/2018

## 2018-05-31 NOTE — PROGRESS NOTES
Hey there, your labs look pretty good overall. Your blood count is mildly elevated for MCV and MCH, this can indicate a low B12, I would start an over the counter B-complex supplement once a day to help get this up. It is your energy vitamin :)    Your liver functions are also mildly high. Some weight loss can help, this is usually due to abdominal fat but your cholesterol looks great. Recheck labs fasting in 6 months.  Gracie Salgado

## 2018-06-05 ENCOUNTER — DOCUMENTATION ONLY (OUTPATIENT)
Dept: FAMILY MEDICINE CLINIC | Age: 58
End: 2018-06-05

## 2018-06-05 NOTE — PROGRESS NOTES
Myrbetriq submitted to [facility] via cover my meds. Awaiting response.    Key: [id number]  PA Case ID: [id number]

## 2018-06-08 NOTE — PROGRESS NOTES
Myrbetriq Er 25mg denied. Must have an intolerance to all of the following: Darifenacin Er, oxybutynin, Tolterodine, and Trospium.    Ref# [id number]

## 2018-06-11 RX ORDER — TOLTERODINE 4 MG/1
4 CAPSULE, EXTENDED RELEASE ORAL DAILY
Qty: 30 CAP | Refills: 5 | Status: SHIPPED | OUTPATIENT
Start: 2018-06-11 | End: 2018-06-18 | Stop reason: CLARIF

## 2018-06-11 NOTE — PROGRESS NOTES
Sent in [facility] [place] 4mg one a day to the pharmacy, this is preferred by insurance.  Usman Gonis

## 2018-06-11 NOTE — PROGRESS NOTES
Please let her know Myrbetric for bladder denied by insurance, what else has she used for meds for bladder control?  [facility]

## 2018-06-11 NOTE — PROGRESS NOTES
Pt notified (confirmed x 2). Patient verbalized understanding. Pt states she has not taken any of the approved meds and is willing to try whatever [facility] wants to prescribe.

## 2018-06-15 DIAGNOSIS — M25.551 RIGHT HIP PAIN: ICD-10-CM

## 2018-06-17 RX ORDER — BACLOFEN 10 MG/1
TABLET ORAL
Qty: 90 TAB | Refills: 1 | Status: SHIPPED | OUTPATIENT
Start: 2018-06-17 | End: 2018-09-17 | Stop reason: SDUPTHER

## 2018-06-18 RX ORDER — OXYBUTYNIN CHLORIDE 10 MG/1
10 TABLET, EXTENDED RELEASE ORAL DAILY
Qty: 30 TAB | Refills: 5 | Status: SHIPPED | OUTPATIENT
Start: 2018-06-18 | End: 2019-10-17 | Stop reason: ALTCHOICE

## 2018-09-17 DIAGNOSIS — M25.551 RIGHT HIP PAIN: ICD-10-CM

## 2018-09-17 RX ORDER — BACLOFEN 10 MG/1
TABLET ORAL
Qty: 90 TAB | Refills: 1 | Status: SHIPPED | OUTPATIENT
Start: 2018-09-17 | End: 2018-10-12 | Stop reason: SDUPTHER

## 2018-10-01 ENCOUNTER — OFFICE VISIT (OUTPATIENT)
Dept: FAMILY MEDICINE CLINIC | Age: 58
End: 2018-10-01

## 2018-10-01 VITALS
RESPIRATION RATE: 17 BRPM | SYSTOLIC BLOOD PRESSURE: 139 MMHG | BODY MASS INDEX: 36.44 KG/M2 | HEART RATE: 81 BPM | HEIGHT: 62 IN | OXYGEN SATURATION: 95 % | TEMPERATURE: 99 F | WEIGHT: 198 LBS | DIASTOLIC BLOOD PRESSURE: 84 MMHG

## 2018-10-01 DIAGNOSIS — J01.00 ACUTE NON-RECURRENT MAXILLARY SINUSITIS: Primary | ICD-10-CM

## 2018-10-01 RX ORDER — AZITHROMYCIN 250 MG/1
TABLET, FILM COATED ORAL
Qty: 6 TAB | Refills: 0 | Status: SHIPPED | OUTPATIENT
Start: 2018-10-01 | End: 2018-10-06

## 2018-10-01 NOTE — PROGRESS NOTES
Chief Complaint Patient presents with  Cough 2 weeks  Nasal Congestion  Chest Congestion Pt seen in the office today for nasal, chest congestion with a cough x's 2 weeks Pt also reports bilateral \"ear popping\". Has tried OTC's, helped with not coughing, sinus pressure continues Subjective:  
Edmar Matthews is a 61 y.o. female who complains of congestion, dry cough and generalized sinus pain for more than 10 days, gradually worsening since that time. She denies a history of shortness of breath and wheezing. Evaluation to date: none. Treatment to date: OTC products. Patient does not smoke cigarettes. Relevant PMH: No pertinent additional PMH. Patient Active Problem List  
Diagnosis Code  Migraines B91.782 Warren Elias neuroma G57.60  Anemia D64.9  
 HTN (hypertension) I10  
 Depressive disorder, not elsewhere classified F32.9 Current Outpatient Prescriptions Medication Sig Dispense Refill  azithromycin (ZITHROMAX Z-DON) 250 mg tablet Please take as directed 6 Tab 0  
 oxybutynin chloride XL (DITROPAN XL) 10 mg CR tablet Take 1 Tab by mouth daily. 30 Tab 5  Cetirizine (ZYRTEC) 10 mg cap Take  by mouth.  TURMERIC ROOT EXTRACT PO Take  by mouth.  hawa root (HAWA EXTRACT PO) Take  by mouth.  nystatin-triamcinolone (MYCOLOG II) topical cream Apply  to affected area two (2) times a day. 30 g 0  
 clonazePAM (KLONOPIN) 0.5 mg tablet TAKE 1 TABLET BY MOUTH TWICE A DAY MX OF 1 MG PER DAY 60 Tab 2  
 mirabegron ER (MYRBETRIQ) 25 mg ER tablet Take 1 Tab by mouth daily. 30 Tab 2  
 escitalopram oxalate (LEXAPRO) 20 mg tablet TAKE 1 TABLET BY MOUTH DAILY 90 Tab 2  
 hydroCHLOROthiazide (MICROZIDE) 12.5 mg capsule TAKE 1 CAPSULE BY MOUTH DAILY 90 Cap 2  
 amLODIPine-valsartan (EXFORGE)  mg per tablet Take 1 Tab by mouth daily. 90 Tab 3  
 levocetirizine (XYZAL) 5 mg tablet Take 1 Tab by mouth daily.  90 Tab 3  
  MILK THISTLE PO Take  by mouth.  aspirin 81 mg chewable tablet Take 81 mg by mouth daily.  multivitamin (ONE A DAY) tablet Take 1 Tab by mouth daily.  psyllium (METAMUCIL SMOOTH TEXTURE) 28 % packet Take  by mouth two (2) times a day.  omega-3 fatty acids-vitamin e (FISH OIL) 1,000 mg Cap Take  by mouth.  baclofen (LIORESAL) 10 mg tablet TAKE 1 TAB BY MOUTH THREE (3) TIMES DAILY. 90 Tab 1  MIMVEY 1-0.5 mg per tablet TAKE 1 TABLET BY MOUTH EVERY DAY  1  
 conj estrogens-bazedoxifene (DUAVEE) 0.45-20 mg tab Take 1 Tab by mouth daily. 90 Tab 3 Allergies Allergen Reactions  Darvon [Propoxyphene] Palpitations Review of Systems Pertinent items are noted in HPI. Objective:  
 
Visit Vitals  /84 (BP 1 Location: Left arm, BP Patient Position: Sitting)  Pulse 81  Temp 99 °F (37.2 °C) (Oral)  Resp 17  Ht 5' 2\" (1.575 m)  Wt 198 lb (89.8 kg)  LMP 07/12/2010  SpO2 95%  BMI 36.21 kg/m2 General:  alert, cooperative, no distress Eyes: conjunctivae/corneas clear. PERRL, EOM's intact. Fundi benign Ears: normal TM's and external ear canals AU Sinuses: tenderness over generalized maxillary Mouth:  Lips, mucosa, and tongue normal. Teeth and gums normal  
Neck: supple, symmetrical, trachea midline and no adenopathy. Heart: S1 and S2 normal, no murmurs noted. Lungs: clear to auscultation bilaterally Abdomen: soft, non-tender. Bowel sounds normal. No masses,  no organomegaly Assessment/Plan:  
sinusitis Suggested symptomatic OTC remedies. Antibiotics per orders. RTC prn. Discussed diagnosis and treatment of viral URIs. Discussed the importance of avoiding unnecessary antibiotic therapy. ICD-10-CM ICD-9-CM 1. Acute non-recurrent maxillary sinusitis J01.00 461.0 azithromycin (ZITHROMAX Z-DON) 250 mg tablet Encounter Diagnoses Name Primary?  Acute non-recurrent maxillary sinusitis Yes Orders Placed This Encounter  azithromycin (ZITHROMAX Z-DON) 250 mg tablet Flavio Kauffman

## 2018-10-01 NOTE — MR AVS SNAPSHOT
[address] [address] 
[phone] Patient: Lisbet Sosa 
MRN:  [id number] Visit Information Date & Time Provider Department Dept. Phone Encounter #  
 10/1/2018 11:20 AM Cindy Olvera MD [address] [facility] [phone] [id number] Upcoming Health Maintenance Date Due Pneumococcal 19-64 Medium Risk (1 of 1 - PPSV23) 11/25/1978 Shingrix Vaccine Age 50> (1 of 2) 11/25/2009 BREAST CANCER SCRN MAMMOGRAM 11/1/2018 Influenza Age 5 to Adult 11/1/2018* PAP AKA CERVICAL CYTOLOGY 11/1/2019 DTaP/Tdap/Td series (2 - Td) 1/20/2022 COLONOSCOPY 12/30/2023 *Topic was postponed. The date shown is not the original due date. Allergies as of 10/1/2018  Review Complete On: 10/1/2018 By: Cindy Olvera MD  
  
 Severity Noted Reaction Type Reactions Darvon [Propoxyphene]  04/26/2010    Palpitations Current Immunizations  Reviewed on 5/24/2016 Name Date Influenza Vaccine Alberteen Dhruv) 10/7/2015 TDAP Vaccine 1/20/2012 Not reviewed this visit You Were Diagnosed With   
  
 Codes Comments Acute non-recurrent maxillary sinusitis    -  Primary ICD-10-CM: J01.00 ICD-9-CM: 461.0 Vitals BP Pulse Temp Resp Height(growth percentile) Weight(growth percentile) 139/84 (BP 1 Location: Left arm, BP Patient Position: Sitting) 81 99 °F (37.2 °C) (Oral) 17 5' 2\" (1.575 m) 198 lb (89.8 kg) LMP SpO2 BMI OB Status Smoking Status 07/12/2010 95% 36.21 kg/m2 Postmenopausal Current Every Day Smoker Vitals History BMI and BSA Data Body Mass Index Body Surface Area  
 36.21 kg/m 2 1.98 m 2 Preferred Pharmacy Pharmacy Name Phone [id number], [address] [facility] [phone] Your Updated Medication List  
  
   
This list is accurate as of 10/1/18 11:55 AM.  Always use your most recent med list.  
  
  
 amLODIPine-valsartan  mg per tablet Commonly known as:  Annice Brochure Take 1 Tab by mouth daily. aspirin 81 mg chewable tablet Take 81 mg by mouth daily. azithromycin 250 mg tablet Commonly known as:  Daisha Jesus Alberto Please take as directed  
  
 baclofen 10 mg tablet Commonly known as:  LIORESAL  
TAKE 1 TAB BY MOUTH THREE (3) TIMES DAILY. clonazePAM 0.5 mg tablet Commonly known as:  KlonoPIN  
TAKE 1 TABLET BY MOUTH TWICE A DAY MX OF 1 MG PER DAY  
  
 conj estrogens-bazedoxifene 0.45-20 mg Tab Commonly known as:  Oskar Axon Take 1 Tab by mouth daily. escitalopram oxalate 20 mg tablet Commonly known as:  Gladys Gonzalez TAKE 1 TABLET BY MOUTH DAILY FISH OIL 1,000 mg Cap Generic drug:  omega-3 fatty acids-vitamin e Take  by mouth. FRANCOIS EXTRACT PO Take  by mouth. hydroCHLOROthiazide 12.5 mg capsule Commonly known as:  Ericka Boning TAKE 1 CAPSULE BY MOUTH DAILY  
  
 levocetirizine 5 mg tablet Commonly known as:  Seo Moons Take 1 Tab by mouth daily. METAMUCIL SMOOTH TEXTURE packet Generic drug:  psyllium Take  by mouth two (2) times a day. MILK THISTLE PO Take  by mouth. MIMVEY 1-0.5 mg per tablet Generic drug:  estradiol-norethindrone TAKE 1 TABLET BY MOUTH EVERY DAY  
  
 mirabegron ER 25 mg ER tablet Commonly known as:  MYRBETRIQ Take 1 Tab by mouth daily. multivitamin tablet Commonly known as:  ONE A DAY Take 1 Tab by mouth daily. nystatin-triamcinolone topical cream  
Commonly known as:  MYCOLOG II Apply  to affected area two (2) times a day. oxybutynin chloride XL 10 mg CR tablet Commonly known as:  DITROPAN XL Take 1 Tab by mouth daily. TURMERIC ROOT EXTRACT PO Take  by mouth. ZyrTEC 10 mg Cap Generic drug:  Cetirizine Take  by mouth. Prescriptions Sent to Pharmacy  Refills  
 azithromycin (ZITHROMAX Z-DON) 250 mg tablet 0  
 Sig: Please take as directed Class: Normal  
 Pharmacy: [facility]  #: [phone] Patient Instructions Body Mass Index: Care Instructions Your Care Instructions Body mass index (BMI) can help you see if your weight is raising your risk for health problems. It uses a formula to compare how much you weigh with how tall you are. · A BMI lower than 18.5 is considered underweight. · A BMI between 18.5 and 24.9 is considered healthy. · A BMI between 25 and 29.9 is considered overweight. A BMI of 30 or higher is considered obese. If your BMI is in the normal range, it means that you have a lower risk for weight-related health problems. If your BMI is in the overweight or obese range, you may be at increased risk for weight-related health problems, such as high blood pressure, heart disease, stroke, arthritis or joint pain, and diabetes. If your BMI is in the underweight range, you may be at increased risk for health problems such as fatigue, lower protection (immunity) against illness, muscle loss, bone loss, hair loss, and hormone problems. BMI is just one measure of your risk for weight-related health problems. You may be at higher risk for health problems if you are not active, you eat an unhealthy diet, or you drink too much alcohol or use tobacco products. Follow-up care is a key part of your treatment and safety. Be sure to make and go to all appointments, and call your doctor if you are having problems. It's also a good idea to know your test results and keep a list of the medicines you take. How can you care for yourself at home? · Practice healthy eating habits. This includes eating plenty of fruits, vegetables, whole grains, lean protein, and low-fat dairy. · If your doctor recommends it, get more exercise. Walking is a good choice. Bit by bit, increase the amount you walk every day.  Try for at least 30 minutes on most days of the week. · Do not smoke. Smoking can increase your risk for health problems. If you need help quitting, talk to your doctor about stop-smoking programs and medicines. These can increase your chances of quitting for good. · Limit alcohol to 2 drinks a day for men and 1 drink a day for women. Too much alcohol can cause health problems. If you have a BMI higher than 25 · Your doctor may do other tests to check your risk for weight-related health problems. This may include measuring the distance around your waist. A waist measurement of more than 40 inches in men or 35 inches in women can increase the risk of weight-related health problems. · Talk with your doctor about steps you can take to stay healthy or improve your health. You may need to make lifestyle changes to lose weight and stay healthy, such as changing your diet and getting regular exercise. If you have a BMI lower than 18.5 · Your doctor may do other tests to check your risk for health problems. · Talk with your doctor about steps you can take to stay healthy or improve your health. You may need to make lifestyle changes to gain or maintain weight and stay healthy, such as getting more healthy foods in your diet and doing exercises to build muscle. Where can you learn more? Go to http://yi-jazlyn.info/. Enter S176 in the search box to learn more about \"Body Mass Index: Care Instructions. \" Current as of: October 13, 2016 Content Version: 11.4 © 3166-9289 Healthwise, Incorporated. Care instructions adapted under license by [employer] (which disclaims liability or warranty for this information). If you have questions about a medical condition or this instruction, always ask your healthcare professional. [address] any warranty or liability for your use of this information. Introducing [facility]! Dear Dayanara Core: Thank you for requesting a [email or web address] account. Our records indicate that you already have an active [email or web address] account. You can access your account anytime at [email or web address] Did you know that you can access your hospital and ER discharge instructions at any time in [email or web address]? You can also review all of your test results from your hospital stay or ER visit. Additional Information If you have questions, please visit the Frequently Asked Questions section of the [email or web address] website at [email or web address] Remember, [email or web address] is NOT to be used for urgent needs. For medical emergencies, dial 911. Now available from your iPhone and Android! Please provide this summary of care documentation to your next provider. Your primary care clinician is listed as Jennifer Celaya. If you have any questions after today's visit, please call [phone].

## 2018-10-01 NOTE — PATIENT INSTRUCTIONS
Body Mass Index: Care Instructions Your Care Instructions Body mass index (BMI) can help you see if your weight is raising your risk for health problems. It uses a formula to compare how much you weigh with how tall you are. · A BMI lower than 18.5 is considered underweight. · A BMI between 18.5 and 24.9 is considered healthy. · A BMI between 25 and 29.9 is considered overweight. A BMI of 30 or higher is considered obese. If your BMI is in the normal range, it means that you have a lower risk for weight-related health problems. If your BMI is in the overweight or obese range, you may be at increased risk for weight-related health problems, such as high blood pressure, heart disease, stroke, arthritis or joint pain, and diabetes. If your BMI is in the underweight range, you may be at increased risk for health problems such as fatigue, lower protection (immunity) against illness, muscle loss, bone loss, hair loss, and hormone problems. BMI is just one measure of your risk for weight-related health problems. You may be at higher risk for health problems if you are not active, you eat an unhealthy diet, or you drink too much alcohol or use tobacco products. Follow-up care is a key part of your treatment and safety. Be sure to make and go to all appointments, and call your doctor if you are having problems. It's also a good idea to know your test results and keep a list of the medicines you take. How can you care for yourself at home? · Practice healthy eating habits. This includes eating plenty of fruits, vegetables, whole grains, lean protein, and low-fat dairy. · If your doctor recommends it, get more exercise. Walking is a good choice. Bit by bit, increase the amount you walk every day. Try for at least 30 minutes on most days of the week. · Do not smoke. Smoking can increase your risk for health problems.  If you need help quitting, talk to your doctor about stop-smoking programs and medicines. These can increase your chances of quitting for good. · Limit alcohol to 2 drinks a day for men and 1 drink a day for women. Too much alcohol can cause health problems. If you have a BMI higher than 25 · Your doctor may do other tests to check your risk for weight-related health problems. This may include measuring the distance around your waist. A waist measurement of more than 40 inches in men or 35 inches in women can increase the risk of weight-related health problems. · Talk with your doctor about steps you can take to stay healthy or improve your health. You may need to make lifestyle changes to lose weight and stay healthy, such as changing your diet and getting regular exercise. If you have a BMI lower than 18.5 · Your doctor may do other tests to check your risk for health problems. · Talk with your doctor about steps you can take to stay healthy or improve your health. You may need to make lifestyle changes to gain or maintain weight and stay healthy, such as getting more healthy foods in your diet and doing exercises to build muscle. Where can you learn more? Go to http://yi-jazlyn.info/. Enter S176 in the search box to learn more about \"Body Mass Index: Care Instructions. \" Current as of: October 13, 2016 Content Version: 11.4 © 6992-2527 Healthwise, Incorporated. Care instructions adapted under license by [employer] (which disclaims liability or warranty for this information). If you have questions about a medical condition or this instruction, always ask your healthcare professional. [address] any warranty or liability for your use of this information.

## 2018-10-12 DIAGNOSIS — I10 ESSENTIAL HYPERTENSION: ICD-10-CM

## 2018-10-12 DIAGNOSIS — R05.8 DRY COUGH: ICD-10-CM

## 2018-10-12 DIAGNOSIS — M25.551 RIGHT HIP PAIN: ICD-10-CM

## 2018-10-12 RX ORDER — BACLOFEN 10 MG/1
TABLET ORAL
Qty: 270 TAB | Refills: 1 | Status: SHIPPED | OUTPATIENT
Start: 2018-10-12 | End: 2019-10-17 | Stop reason: ALTCHOICE

## 2018-10-12 RX ORDER — AMLODIPINE AND VALSARTAN 10; 320 MG/1; MG/1
TABLET ORAL
Qty: 90 TAB | Refills: 3 | Status: SHIPPED | OUTPATIENT
Start: 2018-10-12 | End: 2019-10-06 | Stop reason: SDUPTHER

## 2018-12-31 RX ORDER — ESCITALOPRAM OXALATE 20 MG/1
TABLET ORAL
Qty: 90 TAB | Refills: 3 | Status: SHIPPED | OUTPATIENT
Start: 2018-12-31 | End: 2019-10-29 | Stop reason: SDUPTHER

## 2018-12-31 RX ORDER — HYDROCHLOROTHIAZIDE 12.5 MG/1
CAPSULE ORAL
Qty: 90 CAP | Refills: 3 | Status: SHIPPED | OUTPATIENT
Start: 2018-12-31 | End: 2019-10-29 | Stop reason: SDUPTHER

## 2019-03-13 ENCOUNTER — OFFICE VISIT (OUTPATIENT)
Dept: FAMILY MEDICINE CLINIC | Age: 59
End: 2019-03-13

## 2019-03-13 VITALS
HEIGHT: 62 IN | WEIGHT: 196.8 LBS | BODY MASS INDEX: 36.22 KG/M2 | TEMPERATURE: 98.7 F | RESPIRATION RATE: 14 BRPM | OXYGEN SATURATION: 97 % | HEART RATE: 71 BPM | SYSTOLIC BLOOD PRESSURE: 121 MMHG | DIASTOLIC BLOOD PRESSURE: 73 MMHG

## 2019-03-13 DIAGNOSIS — R10.9 ABDOMINAL CRAMPING: ICD-10-CM

## 2019-03-13 DIAGNOSIS — R19.7 DIARRHEA, UNSPECIFIED TYPE: Primary | ICD-10-CM

## 2019-03-13 PROBLEM — E66.01 SEVERE OBESITY (HCC): Status: ACTIVE | Noted: 2019-03-13

## 2019-03-13 RX ORDER — CIPROFLOXACIN 500 MG/1
500 TABLET ORAL 2 TIMES DAILY
Qty: 20 TAB | Refills: 0 | Status: SHIPPED | OUTPATIENT
Start: 2019-03-13 | End: 2019-03-23

## 2019-03-25 ENCOUNTER — OFFICE VISIT (OUTPATIENT)
Dept: FAMILY MEDICINE CLINIC | Age: 59
End: 2019-03-25

## 2019-03-25 VITALS
HEART RATE: 79 BPM | OXYGEN SATURATION: 98 % | HEIGHT: 62 IN | TEMPERATURE: 98.8 F | SYSTOLIC BLOOD PRESSURE: 120 MMHG | RESPIRATION RATE: 14 BRPM | DIASTOLIC BLOOD PRESSURE: 68 MMHG | BODY MASS INDEX: 36.07 KG/M2 | WEIGHT: 196 LBS

## 2019-03-25 DIAGNOSIS — M54.31 RIGHT SIDED SCIATICA: Primary | ICD-10-CM

## 2019-03-25 RX ORDER — PREDNISONE 10 MG/1
TABLET ORAL
Qty: 21 TAB | Refills: 0 | Status: SHIPPED | OUTPATIENT
Start: 2019-03-25 | End: 2019-10-17 | Stop reason: ALTCHOICE

## 2019-03-25 RX ORDER — KETOROLAC TROMETHAMINE 30 MG/ML
60 INJECTION, SOLUTION INTRAMUSCULAR; INTRAVENOUS ONCE
Qty: 1 VIAL | Refills: 0
Start: 2019-03-25 | End: 2019-03-25

## 2019-03-25 RX ORDER — CYCLOBENZAPRINE HCL 10 MG
10 TABLET ORAL
Qty: 60 TAB | Refills: 1 | Status: SHIPPED | OUTPATIENT
Start: 2019-03-25 | End: 2019-10-17 | Stop reason: ALTCHOICE

## 2019-03-25 NOTE — PROGRESS NOTES
HISTORY OF PRESENT ILLNESS  Ronan Garcia is a 60 y.o. female. HPI  Pt in office today for back and butt pain  -pt states it started Saturday morning but it wasn't as bad  -pt states it got bad Saturday night  -pt has treated w/advil and baclofen 20mg  -pt denies injury    ROS  A comprehensive review of system was obtained and negative except findings in the HPI    Visit Vitals  /68 (BP 1 Location: Right arm, BP Patient Position: Sitting)   Pulse 79   Temp 98.8 °F (37.1 °C) (Oral)   Resp 14   Ht 5' 2\" (1.575 m)   Wt 196 lb (88.9 kg)   LMP 07/12/2010   SpO2 98%   BMI 35.85 kg/m²       Physical Exam   Constitutional: She is oriented to person, place, and time. She appears well-developed and well-nourished. Neck: No JVD present. Cardiovascular: Normal rate, regular rhythm and intact distal pulses. Exam reveals no gallop and no friction rub. No murmur heard. Pulmonary/Chest: Effort normal and breath sounds normal. No respiratory distress. She has no wheezes. Musculoskeletal: She exhibits tenderness. She exhibits no edema. Right lumbar spasm on exam, extremely guarded gait   Neurological: She is alert and oriented to person, place, and time. Skin: Skin is warm. Nursing note and vitals reviewed. ASSESSMENT and PLAN  Encounter Diagnoses   Name Primary?  Right sided sciatica Yes     Orders Placed This Encounter    predniSONE (STERAPRED DS) 10 mg dose pack    cyclobenzaprine (FLEXERIL) 10 mg tablet    ketorolac tromethamine (TORADOL) 60 mg/2 mL soln     Toradol IM 60mg now  Given pred pack and flexeril   Reviewed how to use and what to expect  Recheck 5-7 days if not improving    I have discussed the diagnosis with the patient and the intended plan as seen in the above orders. The patient has received an after-visit summary and questions were answered concerning future plans. Patient conveyed understanding of the plan at the time of the visit.     Arlene Iniguez, MSN, ANP 3/25/2019

## 2019-03-25 NOTE — PROGRESS NOTES
Chief Complaint   Patient presents with    Buttocks pain    Back Pain     Pt in office today for back and butt pain  -pt states it started Saturday morning but it wasn't as bad  -pt states it got bad Saturday night  -pt has treated w/advil and baclofen 20mg  -pt denies injury    Pt has no other concerns

## 2019-03-26 ENCOUNTER — TELEPHONE (OUTPATIENT)
Dept: FAMILY MEDICINE CLINIC | Age: 59
End: 2019-03-26

## 2019-03-26 NOTE — TELEPHONE ENCOUNTER
[facility]. Pt opted to change location to [facility]. Order needs to be faxed to them at [phone] for pt's appt on 3/27/19 @ 11:30. Order faxed as requested.

## 2019-03-26 NOTE — TELEPHONE ENCOUNTER
----- Message from Placido Caba sent at 3/26/2019 10:32 AM EDT -----  Regarding: Dr. Yohana Ramos with [facility]) is requesting the order of adominal VIS (fax [phone]). Pt is scheduled for 11:30am. Best contact is [phone].

## 2019-03-29 NOTE — TELEPHONE ENCOUNTER
Patient id x 3, notified of results and verbalized understanding. States that the diarrhea has seemed to resolve.  Pt will follow up with OB/GYN

## 2019-03-29 NOTE — TELEPHONE ENCOUNTER
----- Message from Drake Pendleton sent at 3/29/2019 11:18 AM EDT -----  Regarding: ILDEFONSO Man/Telephone  Patient is trying to get her CT scan results. Her number is [phone].

## 2019-03-29 NOTE — TELEPHONE ENCOUNTER
Please notify pt that CT shows the followin. Her fatty liver disease that has previously been seen on imaging has resolved, which is great! 2. Previously imaged small liver nodules are unchanged and therefore benign. 3. There is a 4 cm lesion on the left adnexa that is probably an ovarian cyst. The report recommends pt have a follow up pelvic US in 6-10 weeks. No other abnormal findings on CT. If her diarrhea and nausea persist or worsen, will need to see GI for further evaluation.

## 2019-03-29 NOTE — TELEPHONE ENCOUNTER
Please let her know that I am not inin the the office. Not sure if we have received the report, went to outside imaging center, please have someone review for her once it is received.  Marcelo López

## 2019-06-11 DIAGNOSIS — R10.9 ABDOMINAL CRAMPING: ICD-10-CM

## 2019-06-11 DIAGNOSIS — R19.7 DIARRHEA, UNSPECIFIED TYPE: ICD-10-CM

## 2019-10-06 DIAGNOSIS — I10 ESSENTIAL HYPERTENSION: ICD-10-CM

## 2019-10-06 DIAGNOSIS — R05.8 DRY COUGH: ICD-10-CM

## 2019-10-07 RX ORDER — AMLODIPINE AND VALSARTAN 10; 320 MG/1; MG/1
TABLET ORAL
Qty: 90 TAB | Refills: 3 | Status: SHIPPED | OUTPATIENT
Start: 2019-10-07 | End: 2020-10-08

## 2019-10-17 ENCOUNTER — OFFICE VISIT (OUTPATIENT)
Dept: FAMILY MEDICINE CLINIC | Age: 59
End: 2019-10-17

## 2019-10-17 VITALS
DIASTOLIC BLOOD PRESSURE: 65 MMHG | WEIGHT: 191 LBS | HEART RATE: 69 BPM | SYSTOLIC BLOOD PRESSURE: 140 MMHG | RESPIRATION RATE: 18 BRPM | OXYGEN SATURATION: 97 % | BODY MASS INDEX: 35.15 KG/M2 | HEIGHT: 62 IN | TEMPERATURE: 99.3 F

## 2019-10-17 DIAGNOSIS — K58.0 IRRITABLE BOWEL SYNDROME WITH DIARRHEA: Primary | ICD-10-CM

## 2019-10-17 RX ORDER — MULTIVIT WITH MINERALS/HERBS
1 TABLET ORAL DAILY
COMMUNITY

## 2019-10-17 RX ORDER — ACETAMINOPHEN, DIPHENHYDRAMINE HCL, PHENYLEPHRINE HCL 325; 25; 5 MG/1; MG/1; MG/1
TABLET ORAL
COMMUNITY

## 2019-10-17 RX ORDER — DIPHENOXYLATE HYDROCHLORIDE AND ATROPINE SULFATE 2.5; .025 MG/1; MG/1
1 TABLET ORAL
Qty: 60 TAB | Refills: 0 | Status: SHIPPED | OUTPATIENT
Start: 2019-10-17 | End: 2021-12-23 | Stop reason: SDUPTHER

## 2019-10-17 NOTE — PROGRESS NOTES
Chief Complaint   Patient presents with    Irritable Bowel Syndrome     Pt in office today for IBS  -pt states she has been going on for a while  -pt states at times there is little cramping  1. Have you been to the ER, urgent care clinic since your last visit? Hospitalized since your last visit? No    2. Have you seen or consulted any other health care providers outside of the [address] [place] [address] since your last visit? Include any pap smears or colon screening.  No     Pt has no other concerns

## 2019-10-17 NOTE — PROGRESS NOTES
HISTORY OF PRESENT ILLNESS  Ezekiel Lock is a 60 y.o. female. HPI  Pt in office today for IBS  -pt states she has been going on for a while  -pt states at times there is little cramping  Has been stressed  Already had CT that was neg  Imodium does help    ROS  A comprehensive review of system was obtained and negative except findings in the HPI    Visit Vitals  /65 (BP 1 Location: Left arm, BP Patient Position: Sitting)   Pulse 69   Temp 99.3 °F (37.4 °C) (Oral)   Resp 18   Ht 5' 2\" (1.575 m)   Wt 191 lb (86.6 kg)   LMP 07/12/2010   SpO2 97%   BMI 34.93 kg/m²     Physical Exam   Constitutional: She is oriented to person, place, and time. She appears well-developed and well-nourished. Neck: No JVD present. Cardiovascular: Normal rate, regular rhythm and intact distal pulses. Exam reveals no gallop and no friction rub. No murmur heard. Pulmonary/Chest: Effort normal and breath sounds normal. No respiratory distress. She has no wheezes. Musculoskeletal: She exhibits no edema. Neurological: She is alert and oriented to person, place, and time. Skin: Skin is warm. Nursing note and vitals reviewed. ASSESSMENT and PLAN  Encounter Diagnoses   Name Primary?  Irritable bowel syndrome with diarrhea Yes     Orders Placed This Encounter    b complex vitamins (B COMPLEX 1) tablet    melatonin 10 mg tab    diphenoxylate-atropine (LOMOTIL) 2.5-0.025 mg per tablet     Start Lomotil prn for sx  Reviewed adr/se of med  Follow up prn  I have discussed the diagnosis with the patient and the intended plan as seen in the above orders. The patient has received an after-visit summary and questions were answered concerning future plans. Patient conveyed understanding of the plan at the time of the visit.     eRza Baeza, MSN, ANP  10/17/2019

## 2019-10-29 RX ORDER — HYDROCHLOROTHIAZIDE 12.5 MG/1
CAPSULE ORAL
Qty: 90 CAP | Refills: 3 | Status: SHIPPED | OUTPATIENT
Start: 2019-10-29 | End: 2020-10-27

## 2019-10-29 RX ORDER — ESCITALOPRAM OXALATE 20 MG/1
TABLET ORAL
Qty: 90 TAB | Refills: 3 | Status: SHIPPED | OUTPATIENT
Start: 2019-10-29 | End: 2020-10-27

## 2019-12-04 ENCOUNTER — OFFICE VISIT (OUTPATIENT)
Dept: FAMILY MEDICINE CLINIC | Age: 59
End: 2019-12-04

## 2019-12-04 VITALS
SYSTOLIC BLOOD PRESSURE: 97 MMHG | TEMPERATURE: 98.9 F | BODY MASS INDEX: 35.15 KG/M2 | DIASTOLIC BLOOD PRESSURE: 55 MMHG | HEART RATE: 74 BPM | OXYGEN SATURATION: 94 % | RESPIRATION RATE: 16 BRPM | HEIGHT: 62 IN | WEIGHT: 191 LBS

## 2019-12-04 DIAGNOSIS — Z00.00 WELL ADULT EXAM: Primary | ICD-10-CM

## 2019-12-04 DIAGNOSIS — I10 ESSENTIAL HYPERTENSION: ICD-10-CM

## 2019-12-04 NOTE — PROGRESS NOTES
Subjective:   60 y.o. female for Well Woman Check. Her gyne and breast care is done elsewhere by her Ob-Gyne physician. 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  melatonin 10 mg tab Take  by mouth.  diphenoxylate-atropine (LOMOTIL) 2.5-0.025 mg per tablet Take 1 Tab by mouth four (4) times daily as needed for Diarrhea. 60 Tab 0    amLODIPine-valsartan (EXFORGE)  mg per tablet TAKE 1 TABLET BY MOUTH EVERY DAY 90 Tab 3    Cetirizine (ZYRTEC) 10 mg cap Take  by mouth.  TURMERIC ROOT EXTRACT PO Take  by mouth.  psyllium (METAMUCIL SMOOTH TEXTURE) 28 % packet Take  by mouth two (2) times a day.  omega-3 fatty acids-vitamin e (FISH OIL) 1,000 mg Cap Take  by mouth. Family History   Problem Relation Age of Onset    Heart Disease Mother     Heart Disease Paternal Grandmother     Heart Disease Paternal Grandfather      Social History     Tobacco Use    Smoking status: Current Every Day Smoker     Packs/day: 0.50     Types: Cigarettes    Smokeless tobacco: Never Used   Substance Use Topics    Alcohol use: Yes             ROS: Feeling generally well. No TIA's or unusual headaches, no dysphagia. No prolonged cough. No dyspnea or chest pain on exertion. No abdominal pain, change in bowel habits, black or bloody stools. No urinary tract symptoms. No new or unusual musculoskeletal symptoms. Specific concerns today: none. Objective:    The patient appears well, alert, oriented x 3, in no distress. Visit Vitals  BP 97/55 (BP 1 Location: Right arm, BP Patient Position: Sitting)   Pulse 74   Temp 98.9 °F (37.2 °C) (Oral)   Resp 16   Ht 5' 2\" (1.575 m)   Wt 191 lb (86.6 kg)   LMP 07/12/2010   SpO2 94%   BMI 34.93 kg/m²     ENT normal.  Neck supple. No adenopathy or thyromegaly. ISMA. Lungs are clear, good air entry, no wheezes, rhonchi or rales. S1 and S2 normal, no murmurs, regular rate and rhythm. Abdomen soft without tenderness, guarding, mass or organomegaly. Extremities show no edema, normal peripheral pulses. Neurological is normal, no focal findings. Breast and Pelvic exams are deferred. Assessment/Plan:   Well Woman  lose weight, increase physical activity, follow low fat diet, follow low salt diet, routine labs ordered  Encounter Diagnoses   Name Primary?  Well adult exam Yes    Essential hypertension      Orders Placed This Encounter    LIPID PANEL    CBC WITH AUTOMATED DIFF    TSH 3RD GENERATION    METABOLIC PANEL, COMPREHENSIVE     I have discussed the diagnosis with the patient and the intended plan as seen in the above orders. The patient has received an after-visit summary and questions were answered concerning future plans. Patient conveyed understanding of the plan at the time of the visit.     Donal Hussein, MSN, ANP  12/4/2019

## 2019-12-04 NOTE — PROGRESS NOTES
Chief Complaint   Patient presents with    Complete Physical    Labs     Pt in office today for cpe/labs    1. Have you been to the ER, urgent care clinic since your last visit? Hospitalized since your last visit? No    2. Have you seen or consulted any other health care providers outside of the [address] [place] [address] since your last visit? Include any pap smears or colon screening.  No     Pt has no other concerns

## 2019-12-05 LAB
ALBUMIN SERPL-MCNC: 4.4 G/DL (ref 3.6–4.8)
ALBUMIN/GLOB SERPL: 1.8 {RATIO} (ref 1.2–2.2)
ALP SERPL-CCNC: 90 IU/L (ref 39–117)
ALT SERPL-CCNC: 26 IU/L (ref 0–32)
AST SERPL-CCNC: 30 IU/L (ref 0–40)
BASOPHILS # BLD AUTO: 0.1 X10E3/UL (ref 0–0.2)
BASOPHILS NFR BLD AUTO: 1 %
BILIRUB SERPL-MCNC: 0.3 MG/DL (ref 0–1.2)
BUN SERPL-MCNC: 10 MG/DL (ref 8–27)
BUN/CREAT SERPL: 17 (ref 12–28)
CALCIUM SERPL-MCNC: 9.4 MG/DL (ref 8.7–10.3)
CHLORIDE SERPL-SCNC: 101 MMOL/L (ref 96–106)
CHOLEST SERPL-MCNC: 229 MG/DL (ref 100–199)
CO2 SERPL-SCNC: 23 MMOL/L (ref 20–29)
CREAT SERPL-MCNC: 0.6 MG/DL (ref 0.57–1)
EOSINOPHIL # BLD AUTO: 0.1 X10E3/UL (ref 0–0.4)
EOSINOPHIL NFR BLD AUTO: 1 %
ERYTHROCYTE [DISTWIDTH] IN BLOOD BY AUTOMATED COUNT: 11.7 % (ref 12.3–15.4)
GLOBULIN SER CALC-MCNC: 2.4 G/DL (ref 1.5–4.5)
GLUCOSE SERPL-MCNC: 100 MG/DL (ref 65–99)
HCT VFR BLD AUTO: 43.6 % (ref 34–46.6)
HDLC SERPL-MCNC: 76 MG/DL
HGB BLD-MCNC: 15 G/DL (ref 11.1–15.9)
IMM GRANULOCYTES # BLD AUTO: 0 X10E3/UL (ref 0–0.1)
IMM GRANULOCYTES NFR BLD AUTO: 0 %
INTERPRETATION, 910389: NORMAL
LDLC SERPL CALC-MCNC: 136 MG/DL (ref 0–99)
LYMPHOCYTES # BLD AUTO: 2.2 X10E3/UL (ref 0.7–3.1)
LYMPHOCYTES NFR BLD AUTO: 30 %
MCH RBC QN AUTO: 34.2 PG (ref 26.6–33)
MCHC RBC AUTO-ENTMCNC: 34.4 G/DL (ref 31.5–35.7)
MCV RBC AUTO: 100 FL (ref 79–97)
MONOCYTES # BLD AUTO: 0.8 X10E3/UL (ref 0.1–0.9)
MONOCYTES NFR BLD AUTO: 10 %
NEUTROPHILS # BLD AUTO: 4.2 X10E3/UL (ref 1.4–7)
NEUTROPHILS NFR BLD AUTO: 58 %
PLATELET # BLD AUTO: 358 X10E3/UL (ref 150–450)
POTASSIUM SERPL-SCNC: 4.2 MMOL/L (ref 3.5–5.2)
PROT SERPL-MCNC: 6.8 G/DL (ref 6–8.5)
RBC # BLD AUTO: 4.38 X10E6/UL (ref 3.77–5.28)
SODIUM SERPL-SCNC: 139 MMOL/L (ref 134–144)
TRIGL SERPL-MCNC: 87 MG/DL (ref 0–149)
TSH SERPL DL<=0.005 MIU/L-ACNC: 2.51 UIU/ML (ref 0.45–4.5)
VLDLC SERPL CALC-MCNC: 17 MG/DL (ref 5–40)
WBC # BLD AUTO: 7.3 X10E3/UL (ref 3.4–10.8)

## 2019-12-06 NOTE — PROGRESS NOTES
Hey there, your labs look good overall, the cholesterol has gone up quite a bit. Watch the diet for red meat/pork, dairy products, and fried/fast foods.   Recheck fasting in 3  Months   Eleni

## 2020-01-15 ENCOUNTER — TELEPHONE (OUTPATIENT)
Dept: FAMILY MEDICINE CLINIC | Age: 60
End: 2020-01-15

## 2020-01-15 RX ORDER — CIPROFLOXACIN 500 MG/1
500 TABLET ORAL 2 TIMES DAILY
Qty: 14 TAB | Refills: 0 | Status: SHIPPED | OUTPATIENT
Start: 2020-01-15 | End: 2020-01-22

## 2020-01-15 RX ORDER — PHENAZOPYRIDINE HYDROCHLORIDE 100 MG/1
100 TABLET, FILM COATED ORAL
Qty: 20 TAB | Refills: 0 | Status: SHIPPED | OUTPATIENT
Start: 2020-01-15 | End: 2020-01-18

## 2020-01-15 NOTE — TELEPHONE ENCOUNTER
Spoke with pt pt states that she went to the beach and came back with a bladder infection.  Pt would like to have med called in

## 2020-01-15 NOTE — TELEPHONE ENCOUNTER
----- Message from Kalani Chaidez sent at 1/15/2020 12:13 PM EST -----  Regarding: ILDEFONSO Man/Telephone  General Message/Vendor Calls    Caller's first and last name: Tiff Gtz      Reason for call:unknown      Callback required yes/no and why:yes      Best contact number(s):[phone]      Details to clarify the request: Please call the patient      Kalani Chaidez

## 2020-10-08 DIAGNOSIS — R05.8 DRY COUGH: ICD-10-CM

## 2020-10-08 DIAGNOSIS — I10 ESSENTIAL HYPERTENSION: ICD-10-CM

## 2020-10-08 RX ORDER — AMLODIPINE AND VALSARTAN 10; 320 MG/1; MG/1
TABLET ORAL
Qty: 90 TAB | Refills: 3 | Status: SHIPPED | OUTPATIENT
Start: 2020-10-08 | End: 2021-10-17

## 2020-10-27 RX ORDER — HYDROCHLOROTHIAZIDE 12.5 MG/1
CAPSULE ORAL
Qty: 90 CAP | Refills: 3 | Status: SHIPPED | OUTPATIENT
Start: 2020-10-27 | End: 2021-11-24

## 2020-10-27 RX ORDER — ESCITALOPRAM OXALATE 20 MG/1
TABLET ORAL
Qty: 90 TAB | Refills: 3 | Status: SHIPPED | OUTPATIENT
Start: 2020-10-27 | End: 2021-11-24

## 2020-12-17 ENCOUNTER — OFFICE VISIT (OUTPATIENT)
Dept: FAMILY MEDICINE CLINIC | Age: 60
End: 2020-12-17
Payer: COMMERCIAL

## 2020-12-17 VITALS
OXYGEN SATURATION: 97 % | BODY MASS INDEX: 36.62 KG/M2 | DIASTOLIC BLOOD PRESSURE: 73 MMHG | TEMPERATURE: 98.9 F | HEIGHT: 62 IN | HEART RATE: 73 BPM | SYSTOLIC BLOOD PRESSURE: 114 MMHG | RESPIRATION RATE: 14 BRPM | WEIGHT: 199 LBS

## 2020-12-17 DIAGNOSIS — Z00.00 WELL ADULT EXAM: Primary | ICD-10-CM

## 2020-12-17 DIAGNOSIS — Z12.31 VISIT FOR SCREENING MAMMOGRAM: ICD-10-CM

## 2020-12-17 DIAGNOSIS — I10 ESSENTIAL HYPERTENSION: ICD-10-CM

## 2020-12-17 DIAGNOSIS — Z23 ENCOUNTER FOR IMMUNIZATION: ICD-10-CM

## 2020-12-17 PROCEDURE — 90471 IMMUNIZATION ADMIN: CPT

## 2020-12-17 PROCEDURE — 90670 PCV13 VACCINE IM: CPT

## 2020-12-17 PROCEDURE — 99396 PREV VISIT EST AGE 40-64: CPT | Performed by: NURSE PRACTITIONER

## 2020-12-17 NOTE — PROGRESS NOTES
Subjective:   63 y.o. female for Well Woman Check. Her gyne and breast care is done elsewhere by her Ob-Gyne physician. 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  melatonin 10 mg tab Take  by mouth.  diphenoxylate-atropine (LOMOTIL) 2.5-0.025 mg per tablet Take 1 Tab by mouth four (4) times daily as needed for Diarrhea. 60 Tab 0    Cetirizine (ZYRTEC) 10 mg cap Take  by mouth.  TURMERIC ROOT EXTRACT PO Take  by mouth.  psyllium (METAMUCIL SMOOTH TEXTURE) 28 % packet Take  by mouth two (2) times a day.  omega-3 fatty acids-vitamin e (FISH OIL) 1,000 mg Cap Take  by mouth. Family History   Problem Relation Age of Onset    Heart Disease Mother     Heart Disease Paternal Grandmother     Heart Disease Paternal Grandfather      Social History     Tobacco Use    Smoking status: Current Every Day Smoker     Packs/day: 0.50     Types: Cigarettes    Smokeless tobacco: Never Used   Substance Use Topics    Alcohol use: Yes         ROS: Feeling generally well. No TIA's or unusual headaches, no dysphagia. No prolonged cough. No dyspnea or chest pain on exertion. No abdominal pain, change in bowel habits, black or bloody stools. No urinary tract symptoms. No new or unusual musculoskeletal symptoms. Specific concerns today: none. Objective:    The patient appears well, alert, oriented x 3, in no distress. Visit Vitals  /73 (BP 1 Location: Right arm, BP Patient Position: Sitting)   Pulse 73   Temp 98.9 °F (37.2 °C) (Oral)   Resp 14   Ht 5' 2\" (1.575 m)   Wt 199 lb (90.3 kg)   LMP 07/12/2010   SpO2 97%   BMI 36.40 kg/m²     ENT normal.  Neck supple. No adenopathy or thyromegaly. ISMA. Lungs are clear, good air entry, no wheezes, rhonchi or rales. S1 and S2 normal, no murmurs, regular rate and rhythm. Abdomen soft without tenderness, guarding, mass or organomegaly. Extremities show no edema, normal peripheral pulses. Neurological is normal, no focal findings. Breast and Pelvic exams are deferred. Assessment/Plan:   Well Woman  lose weight, increase physical activity, follow low fat diet, follow low salt diet, routine labs ordered  Encounter Diagnoses   Name Primary?  Well adult exam Yes    Essential hypertension     Visit for screening mammogram     Encounter for immunization      Orders Placed This Encounter    KERI MAMMO BI SCREENING INCL CAD    PNEUMOCOCCAL CONJ VACCINE 13 VALENT IM (Age 48 and over)    LIPID PANEL    METABOLIC PANEL, COMPREHENSIVE    CBC WITH AUTOMATED DIFF    TSH 3RD GENERATION     I have discussed the diagnosis with the patient and the intended plan as seen in the above orders. The patient has received an after-visit summary and questions were answered concerning future plans. Patient conveyed understanding of the plan at the time of the visit.     Alyse Rosen, MSN, ANP  12/17/2020

## 2020-12-17 NOTE — PROGRESS NOTES
Chief Complaint   Patient presents with    Complete Physical    Labs     Pt I n office today for cpe  -labs    1. Have you been to the ER, urgent care clinic since your last visit? Hospitalized since your last visit? No    2. Have you seen or consulted any other health care providers outside of the [address] [place] [address] since your last visit? Include any pap smears or colon screening. No     Chief Complaint   Patient presents with    Complete Physical    Labs      Visit Vitals  /73 (BP 1 Location: Right arm, BP Patient Position: Sitting)   Pulse 73   Temp 98.9 °F (37.2 °C) (Oral)   Resp 14   Ht 5' 2\" (1.575 m)   Wt 199 lb (90.3 kg)   SpO2 97%   BMI 36.40 kg/m²       After obtaining Wen Chino's consent, and per orders of pinky, injection of prevnar 13 given by [employer] in (R) delt. Patient instructed to remain in clinic for 20 minutes afterwards, and to report any adverse reaction to me immediately. Patient did not display any adverse side effects. Pt / caregiver given opportunity to review vaccine information sheet prior to vaccine administration. Opportunity given for questions and concerns. No questions or concerns at this time.     Pt has no other concerns

## 2020-12-18 LAB
ALBUMIN SERPL-MCNC: 4.4 G/DL (ref 3.8–4.8)
ALBUMIN/GLOB SERPL: 2 {RATIO} (ref 1.2–2.2)
ALP SERPL-CCNC: 103 IU/L (ref 39–117)
ALT SERPL-CCNC: 21 IU/L (ref 0–32)
AST SERPL-CCNC: 24 IU/L (ref 0–40)
BASOPHILS # BLD AUTO: 0.1 X10E3/UL (ref 0–0.2)
BASOPHILS NFR BLD AUTO: 1 %
BILIRUB SERPL-MCNC: 0.3 MG/DL (ref 0–1.2)
BUN SERPL-MCNC: 10 MG/DL (ref 8–27)
BUN/CREAT SERPL: 16 (ref 12–28)
CALCIUM SERPL-MCNC: 9.3 MG/DL (ref 8.7–10.3)
CHLORIDE SERPL-SCNC: 104 MMOL/L (ref 96–106)
CHOLEST SERPL-MCNC: 199 MG/DL (ref 100–199)
CO2 SERPL-SCNC: 25 MMOL/L (ref 20–29)
CREAT SERPL-MCNC: 0.62 MG/DL (ref 0.57–1)
EOSINOPHIL # BLD AUTO: 0.2 X10E3/UL (ref 0–0.4)
EOSINOPHIL NFR BLD AUTO: 2 %
ERYTHROCYTE [DISTWIDTH] IN BLOOD BY AUTOMATED COUNT: 11.8 % (ref 11.7–15.4)
GLOBULIN SER CALC-MCNC: 2.2 G/DL (ref 1.5–4.5)
GLUCOSE SERPL-MCNC: 103 MG/DL (ref 65–99)
HCT VFR BLD AUTO: 41.3 % (ref 34–46.6)
HDLC SERPL-MCNC: 78 MG/DL
HGB BLD-MCNC: 14.3 G/DL (ref 11.1–15.9)
IMM GRANULOCYTES # BLD AUTO: 0 X10E3/UL (ref 0–0.1)
IMM GRANULOCYTES NFR BLD AUTO: 0 %
INTERPRETATION, 910389: NORMAL
LDLC SERPL CALC-MCNC: 107 MG/DL (ref 0–99)
LYMPHOCYTES # BLD AUTO: 2.7 X10E3/UL (ref 0.7–3.1)
LYMPHOCYTES NFR BLD AUTO: 32 %
MCH RBC QN AUTO: 34.4 PG (ref 26.6–33)
MCHC RBC AUTO-ENTMCNC: 34.6 G/DL (ref 31.5–35.7)
MCV RBC AUTO: 99 FL (ref 79–97)
MONOCYTES # BLD AUTO: 0.8 X10E3/UL (ref 0.1–0.9)
MONOCYTES NFR BLD AUTO: 10 %
NEUTROPHILS # BLD AUTO: 4.5 X10E3/UL (ref 1.4–7)
NEUTROPHILS NFR BLD AUTO: 55 %
PLATELET # BLD AUTO: 334 X10E3/UL (ref 150–450)
POTASSIUM SERPL-SCNC: 4.1 MMOL/L (ref 3.5–5.2)
PROT SERPL-MCNC: 6.6 G/DL (ref 6–8.5)
RBC # BLD AUTO: 4.16 X10E6/UL (ref 3.77–5.28)
SODIUM SERPL-SCNC: 139 MMOL/L (ref 134–144)
TRIGL SERPL-MCNC: 81 MG/DL (ref 0–149)
TSH SERPL DL<=0.005 MIU/L-ACNC: 2.48 UIU/ML (ref 0.45–4.5)
VLDLC SERPL CALC-MCNC: 14 MG/DL (ref 5–40)
WBC # BLD AUTO: 8.2 X10E3/UL (ref 3.4–10.8)

## 2020-12-31 DIAGNOSIS — Z12.31 VISIT FOR SCREENING MAMMOGRAM: ICD-10-CM

## 2021-01-19 ENCOUNTER — HOSPITAL ENCOUNTER (OUTPATIENT)
Dept: MAMMOGRAPHY | Age: 61
Discharge: HOME | End: 2021-01-19
Attending: NURSE PRACTITIONER
Payer: COMMERCIAL

## 2021-01-19 PROCEDURE — 77067 SCR MAMMO BI INCL CAD: CPT

## 2021-10-13 ENCOUNTER — VIRTUAL VISIT (OUTPATIENT)
Dept: FAMILY MEDICINE CLINIC | Age: 61
End: 2021-10-13
Payer: COMMERCIAL

## 2021-10-13 DIAGNOSIS — M54.31 RIGHT SIDED SCIATICA: Primary | ICD-10-CM

## 2021-10-13 DIAGNOSIS — M25.551 RIGHT HIP PAIN: ICD-10-CM

## 2021-10-13 PROCEDURE — 99214 OFFICE O/P EST MOD 30 MIN: CPT | Performed by: NURSE PRACTITIONER

## 2021-10-13 RX ORDER — PREDNISONE 10 MG/1
TABLET ORAL
Qty: 21 TABLET | Refills: 0 | Status: SHIPPED | OUTPATIENT
Start: 2021-10-13 | End: 2021-12-23 | Stop reason: ALTCHOICE

## 2021-10-13 RX ORDER — METHOCARBAMOL 500 MG/1
500 TABLET, FILM COATED ORAL
Qty: 60 TABLET | Refills: 1 | Status: SHIPPED | OUTPATIENT
Start: 2021-10-13 | End: 2021-12-06

## 2021-10-13 NOTE — PROGRESS NOTES
Kenna Hwang (: 1960) is a 63 y.o. female, established patient, here for evaluation of the following chief complaint(s):   Hip Pain       ASSESSMENT/PLAN:  Below is the assessment and plan developed based on review of pertinent history, labs, studies, and medications. Diagnoses and all orders for this visit:    1. Right sided sciatica  -     XR SPINE LUMB 2 OR 3 V; Future    2. Right hip pain  -     XR SPINE LUMB 2 OR 3 V; Future    Other orders  -     predniSONE (STERAPRED DS) 10 mg dose pack; See administration instruction per 10mg dose pack - 6 days  -     methocarbamoL (ROBAXIN) 500 mg tablet; Take 1 Tablet by mouth three (3) times daily as needed for Muscle Spasm(s).       Given pred and robaxin  Xrays ordered  Dev plan with results  May need to see Ortho    SUBJECTIVE/OBJECTIVE:  HPI    Review of Systems     No data recorded     Physical Exam    [INSTRUCTIONS:  \"[x]\" Indicates a positive item  \"[]\" Indicates a negative item  -- DELETE ALL ITEMS NOT EXAMINED]    Constitutional: [x] Appears well-developed and well-nourished [x] No apparent distress      [] Abnormal -     Mental status: [x] Alert and awake  [x] Oriented to person/place/time [x] Able to follow commands    [] Abnormal -     Eyes:   EOM    [x]  Normal    [] Abnormal -   Sclera  [x]  Normal    [] Abnormal -          Discharge [x]  None visible   [] Abnormal -     HENT: [x] Normocephalic, atraumatic  [] Abnormal -   [x] Mouth/Throat: Mucous membranes are moist    External Ears [x] Normal  [] Abnormal -    Neck: [x] No visualized mass [] Abnormal -     Pulmonary/Chest: [x] Respiratory effort normal   [x] No visualized signs of difficulty breathing or respiratory distress        [] Abnormal -      Musculoskeletal:   [x] Normal gait with no signs of ataxia         [x] Normal range of motion of neck        [] Abnormal -     Neurological:        [x] No Facial Asymmetry (Cranial nerve 7 motor function) (limited exam due to video visit) [x] No gaze palsy        [] Abnormal -          Skin:        [x] No significant exanthematous lesions or discoloration noted on facial skin         [] Abnormal -            Psychiatric:       [x] Normal Affect [] Abnormal -        [x] No Hallucinations    Other pertinent observable physical exam findings:-        On this date 10/13/2021 I have spent 15 minutes reviewing previous notes, test results and face to face (virtual) with the patient discussing the diagnosis and importance of compliance with the treatment plan as well as documenting on the day of the visit. Alyx Claudia, was evaluated through a synchronous (real-time) audio-video encounter. The patient (or guardian if applicable) is aware that this is a billable service. Verbal consent to proceed has been obtained within the past 12 months. The visit was conducted pursuant to the emergency declaration under the [address] [place] [address], [address] [place] [address] authority and the [employer] and [employer] Act. Patient identification was verified, and a caregiver was present when appropriate. The patient was located in a state where the provider was credentialed to provide care. An electronic signature was used to authenticate this note.   -- Betty Ibarra NP

## 2021-10-14 ENCOUNTER — HOSPITAL ENCOUNTER (OUTPATIENT)
Dept: GENERAL RADIOLOGY | Age: 61
Discharge: HOME | End: 2021-10-14
Payer: COMMERCIAL

## 2021-10-14 DIAGNOSIS — M25.551 RIGHT HIP PAIN: ICD-10-CM

## 2021-10-14 DIAGNOSIS — M54.31 RIGHT SIDED SCIATICA: ICD-10-CM

## 2021-10-14 PROCEDURE — 72100 X-RAY EXAM L-S SPINE 2/3 VWS: CPT

## 2021-10-15 DIAGNOSIS — R05.8 DRY COUGH: ICD-10-CM

## 2021-10-15 DIAGNOSIS — I10 ESSENTIAL HYPERTENSION: ICD-10-CM

## 2021-10-15 NOTE — PROGRESS NOTES
Your xray shows that you definitely have disc disease. Would make an appointment to see Dr. Erum Vega as discussed.  Cinthya Bliss

## 2021-10-17 RX ORDER — AMLODIPINE AND VALSARTAN 10; 320 MG/1; MG/1
TABLET ORAL
Qty: 90 TABLET | Refills: 3 | Status: SHIPPED | OUTPATIENT
Start: 2021-10-17 | End: 2022-10-24

## 2021-10-20 ENCOUNTER — TELEPHONE (OUTPATIENT)
Dept: FAMILY MEDICINE CLINIC | Age: 61
End: 2021-10-20

## 2021-10-20 NOTE — TELEPHONE ENCOUNTER
Patient requesting results of xray last week sent to Dr Kerri Meza/Fax: [phone].  Patient's phone: [phone]

## 2021-11-24 RX ORDER — HYDROCHLOROTHIAZIDE 12.5 MG/1
CAPSULE ORAL
Qty: 90 CAPSULE | Refills: 3 | Status: SHIPPED | OUTPATIENT
Start: 2021-11-24

## 2021-11-24 RX ORDER — ESCITALOPRAM OXALATE 20 MG/1
TABLET ORAL
Qty: 90 TABLET | Refills: 3 | Status: SHIPPED | OUTPATIENT
Start: 2021-11-24

## 2021-12-06 RX ORDER — METHOCARBAMOL 500 MG/1
500 TABLET, FILM COATED ORAL
Qty: 60 TABLET | Refills: 1 | Status: SHIPPED | OUTPATIENT
Start: 2021-12-06 | End: 2021-12-23 | Stop reason: ALTCHOICE

## 2021-12-07 ENCOUNTER — TELEPHONE (OUTPATIENT)
Dept: FAMILY MEDICINE CLINIC | Age: 61
End: 2021-12-07

## 2021-12-07 NOTE — TELEPHONE ENCOUNTER
----- Message from Boris Bang sent at 12/7/2021 11:57 AM EST -----  Subject: Message to Provider    QUESTIONS  Information for Provider? Patient was reaching out to have someone go over   her x-rays with her. Patient said she has reached out before and no one   has called her back to give her the results. Can someone please contact   the patient with her results?   ---------------------------------------------------------------------------  --------------  [address]  What is the best way for the office to contact you? OK to leave message on   voicemail  Preferred Call Back Phone Number? [phone]  ---------------------------------------------------------------------------  --------------  SCRIPT ANSWERS  Relationship to Patient?  Self

## 2021-12-08 NOTE — TELEPHONE ENCOUNTER
Spoke with pt, she states that she can not set up booker apt with dr Florinda Mathews until he has the x-ray results.  Sent information with confirmation received

## 2021-12-23 ENCOUNTER — OFFICE VISIT (OUTPATIENT)
Dept: FAMILY MEDICINE CLINIC | Age: 61
End: 2021-12-23
Payer: COMMERCIAL

## 2021-12-23 VITALS
HEART RATE: 70 BPM | SYSTOLIC BLOOD PRESSURE: 125 MMHG | BODY MASS INDEX: 35.88 KG/M2 | TEMPERATURE: 98.3 F | WEIGHT: 195 LBS | OXYGEN SATURATION: 96 % | RESPIRATION RATE: 18 BRPM | HEIGHT: 62 IN | DIASTOLIC BLOOD PRESSURE: 77 MMHG

## 2021-12-23 DIAGNOSIS — E66.01 SEVERE OBESITY (BMI 35.0-35.9 WITH COMORBIDITY) (HCC): ICD-10-CM

## 2021-12-23 DIAGNOSIS — K58.0 IRRITABLE BOWEL SYNDROME WITH DIARRHEA: ICD-10-CM

## 2021-12-23 DIAGNOSIS — Z12.31 VISIT FOR SCREENING MAMMOGRAM: ICD-10-CM

## 2021-12-23 DIAGNOSIS — Z00.00 WELL ADULT EXAM: Primary | ICD-10-CM

## 2021-12-23 DIAGNOSIS — I10 ESSENTIAL HYPERTENSION: ICD-10-CM

## 2021-12-23 PROCEDURE — 99396 PREV VISIT EST AGE 40-64: CPT | Performed by: NURSE PRACTITIONER

## 2021-12-23 RX ORDER — DIPHENOXYLATE HYDROCHLORIDE AND ATROPINE SULFATE 2.5; .025 MG/1; MG/1
1 TABLET ORAL
Qty: 60 TABLET | Refills: 2 | Status: SHIPPED | OUTPATIENT
Start: 2021-12-23

## 2021-12-23 NOTE — PROGRESS NOTES
Chief Complaint   Patient presents with    Complete Physical    Labs     Pt being seen for cpe  -labs    1. Have you been to the ER, urgent care clinic since your last visit? Hospitalized since your last visit? No    2. Have you seen or consulted any other health care providers outside of the [address] [place] [address] since your last visit? Include any pap smears or colon screening.  No     Pt has no other concerns

## 2021-12-24 LAB
ALBUMIN SERPL-MCNC: 4.4 G/DL (ref 3.8–4.8)
ALBUMIN/GLOB SERPL: 1.7 {RATIO} (ref 1.2–2.2)
ALP SERPL-CCNC: 95 IU/L (ref 44–121)
ALT SERPL-CCNC: 25 IU/L (ref 0–32)
AST SERPL-CCNC: 28 IU/L (ref 0–40)
BASOPHILS # BLD AUTO: 0.1 X10E3/UL (ref 0–0.2)
BASOPHILS NFR BLD AUTO: 1 %
BILIRUB SERPL-MCNC: 0.5 MG/DL (ref 0–1.2)
BUN SERPL-MCNC: 10 MG/DL (ref 8–27)
BUN/CREAT SERPL: 19 (ref 12–28)
CALCIUM SERPL-MCNC: 9.3 MG/DL (ref 8.7–10.3)
CHLORIDE SERPL-SCNC: 100 MMOL/L (ref 96–106)
CHOLEST SERPL-MCNC: 232 MG/DL (ref 100–199)
CO2 SERPL-SCNC: 22 MMOL/L (ref 20–29)
CREAT SERPL-MCNC: 0.54 MG/DL (ref 0.57–1)
EOSINOPHIL # BLD AUTO: 0.1 X10E3/UL (ref 0–0.4)
EOSINOPHIL NFR BLD AUTO: 2 %
ERYTHROCYTE [DISTWIDTH] IN BLOOD BY AUTOMATED COUNT: 11.8 % (ref 11.7–15.4)
GLOBULIN SER CALC-MCNC: 2.6 G/DL (ref 1.5–4.5)
GLUCOSE SERPL-MCNC: 88 MG/DL (ref 65–99)
HCT VFR BLD AUTO: 43.1 % (ref 34–46.6)
HDLC SERPL-MCNC: 91 MG/DL
HGB BLD-MCNC: 14.7 G/DL (ref 11.1–15.9)
IMM GRANULOCYTES # BLD AUTO: 0 X10E3/UL (ref 0–0.1)
IMM GRANULOCYTES NFR BLD AUTO: 0 %
IMP & REVIEW OF LAB RESULTS: NORMAL
LDLC SERPL CALC-MCNC: 128 MG/DL (ref 0–99)
LYMPHOCYTES # BLD AUTO: 2.4 X10E3/UL (ref 0.7–3.1)
LYMPHOCYTES NFR BLD AUTO: 26 %
MCH RBC QN AUTO: 34.3 PG (ref 26.6–33)
MCHC RBC AUTO-ENTMCNC: 34.1 G/DL (ref 31.5–35.7)
MCV RBC AUTO: 101 FL (ref 79–97)
MONOCYTES # BLD AUTO: 0.7 X10E3/UL (ref 0.1–0.9)
MONOCYTES NFR BLD AUTO: 8 %
NEUTROPHILS # BLD AUTO: 5.8 X10E3/UL (ref 1.4–7)
NEUTROPHILS NFR BLD AUTO: 63 %
PLATELET # BLD AUTO: 343 X10E3/UL (ref 150–450)
POTASSIUM SERPL-SCNC: 3.9 MMOL/L (ref 3.5–5.2)
PROT SERPL-MCNC: 7 G/DL (ref 6–8.5)
RBC # BLD AUTO: 4.29 X10E6/UL (ref 3.77–5.28)
SODIUM SERPL-SCNC: 137 MMOL/L (ref 134–144)
TRIGL SERPL-MCNC: 79 MG/DL (ref 0–149)
TSH SERPL DL<=0.005 MIU/L-ACNC: 2.23 UIU/ML (ref 0.45–4.5)
VLDLC SERPL CALC-MCNC: 13 MG/DL (ref 5–40)
WBC # BLD AUTO: 9.1 X10E3/UL (ref 3.4–10.8)

## 2021-12-28 NOTE — PROGRESS NOTES
Your labs oveall look great but your cholesterol has gone through the roof. Watch the diet for red meat/pork, dairy products, and fried/fast foods.     Recheck 3 months fasting, pinky

## 2021-12-30 NOTE — PROGRESS NOTES
Subjective:   57 y.o. female for Well Woman Check. Her gyne and breast care is done elsewhere by her Ob-Gyne physician. 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 60 Tablet 2    hydroCHLOROthiazide (MICROZIDE) 12.5 mg capsule TAKE 1 CAPSULE BY MOUTH DAILY 90 Capsule 3    escitalopram oxalate (LEXAPRO) 20 mg tablet TAKE 1 TABLET BY MOUTH DAILY 90 Tablet 3    amLODIPine-valsartan (EXFORGE)  mg per tablet TAKE 1 TABLET BY MOUTH EVERY DAY 90 Tablet 3    b complex vitamins (B COMPLEX 1) tablet Take 1 Tab by mouth daily.  melatonin 10 mg tab Take  by mouth.  Cetirizine (ZYRTEC) 10 mg cap Take  by mouth.  TURMERIC ROOT EXTRACT PO Take  by mouth.  psyllium (METAMUCIL SMOOTH TEXTURE) 28 % packet Take  by mouth two (2) times a day.  omega-3 fatty acids-vitamin e (FISH OIL) 1,000 mg Cap Take  by mouth. Family History   Problem Relation Age of Onset    Heart Disease Mother     Heart Disease Paternal Grandmother     Heart Disease Paternal Grandfather      Social History     Tobacco Use    Smoking status: Current Every Day Smoker     Packs/day: 0.50     Types: Cigarettes    Smokeless tobacco: Never Used   Substance Use Topics    Alcohol use: Yes             ROS: Feeling generally well. No TIA's or unusual headaches, no dysphagia. No prolonged cough. No dyspnea or chest pain on exertion. No abdominal pain, change in bowel habits, black or bloody stools. No urinary tract symptoms. No new or unusual musculoskeletal symptoms. Specific concerns today: none.     Objective: The patient appears well, alert, oriented x 3, in no distress. Visit Vitals  /77 (BP 1 Location: Right arm, BP Patient Position: Sitting)   Pulse 70   Temp 98.3 °F (36.8 °C) (Oral)   Resp 18   Ht 5' 2\" (1.575 m)   Wt 195 lb (88.5 kg)   LMP 07/12/2010   SpO2 96%   BMI 35.67 kg/m²     ENT normal.  Neck supple. No adenopathy or thyromegaly. ISMA. Lungs are clear, good air entry, no wheezes, rhonchi or rales. S1 and S2 normal, no murmurs, regular rate and rhythm. Abdomen soft without tenderness, guarding, mass or organomegaly. Extremities show no edema, normal peripheral pulses. Neurological is normal, no focal findings. Breast and Pelvic exams are deferred. Assessment/Plan:   Well Woman  lose weight, increase physical activity, follow low fat diet, follow low salt diet, routine labs ordered  Encounter Diagnoses   Name Primary?  Well adult exam Yes    Irritable bowel syndrome with diarrhea     Severe obesity (BMI 35.0-35.9 with comorbidity) [address]     Essential hypertension     Visit for screening mammogram      Orders Placed This Encounter    KERI MAMMO BI SCREENING INCL CAD    CBC WITH AUTOMATED DIFF    METABOLIC PANEL, COMPREHENSIVE    TSH 3RD GENERATION    LIPID PANEL    CVD REPORT    diphenoxylate-atropine (LomotiL) 2.5-0.025 mg per tablet     I have discussed the diagnosis with the patient and the intended plan as seen in the above orders. The patient has received an after-visit summary and questions were answered concerning future plans. Patient conveyed understanding of the plan at the time of the visit.     Keysha Wynn, MSN, ANP  12/29/2021

## 2022-02-17 ENCOUNTER — TELEPHONE (OUTPATIENT)
Dept: FAMILY MEDICINE CLINIC | Age: 62
End: 2022-02-17

## 2022-02-17 DIAGNOSIS — M51.36 DDD (DEGENERATIVE DISC DISEASE), LUMBAR: Primary | ICD-10-CM

## 2022-02-17 NOTE — TELEPHONE ENCOUNTER
Still need the name of the specific practice, location and specific MD name to be able to put in referral. [facility], [place]

## 2022-02-17 NOTE — TELEPHONE ENCOUNTER
Pt called in and states she needs an order from you to see Peewee Puckett, with [facility]. Call back number for pt in case is [phone]. Thanks.

## 2022-02-17 NOTE — TELEPHONE ENCOUNTER
[facility] name of the group and she states that provider told her she needed to see them because of imaging she had done

## 2022-02-21 ENCOUNTER — TELEPHONE (OUTPATIENT)
Dept: FAMILY MEDICINE CLINIC | Age: 62
End: 2022-02-21

## 2022-03-19 PROBLEM — E66.01 SEVERE OBESITY (HCC): Status: ACTIVE | Noted: 2019-03-13

## 2022-03-29 ENCOUNTER — HOSPITAL ENCOUNTER (OUTPATIENT)
Dept: MAMMOGRAPHY | Age: 62
Discharge: HOME | End: 2022-03-29
Attending: NURSE PRACTITIONER
Payer: COMMERCIAL

## 2022-03-29 DIAGNOSIS — Z12.31 VISIT FOR SCREENING MAMMOGRAM: ICD-10-CM

## 2022-03-29 PROCEDURE — 77067 SCR MAMMO BI INCL CAD: CPT

## 2022-04-08 ENCOUNTER — DOCUMENTATION ONLY (OUTPATIENT)
Dept: FAMILY MEDICINE CLINIC | Age: 62
End: 2022-04-08

## 2022-10-23 DIAGNOSIS — I10 ESSENTIAL HYPERTENSION: ICD-10-CM

## 2022-10-23 DIAGNOSIS — R05.8 DRY COUGH: ICD-10-CM

## 2022-10-24 RX ORDER — AMLODIPINE AND VALSARTAN 10; 320 MG/1; MG/1
TABLET ORAL
Qty: 90 TABLET | Refills: 3 | Status: SHIPPED | OUTPATIENT
Start: 2022-10-24

## 2022-11-21 RX ORDER — HYDROCHLOROTHIAZIDE 12.5 MG/1
CAPSULE ORAL
Qty: 90 CAPSULE | Refills: 3 | Status: SHIPPED | OUTPATIENT
Start: 2022-11-21

## 2022-11-21 RX ORDER — ESCITALOPRAM OXALATE 20 MG/1
TABLET ORAL
Qty: 90 TABLET | Refills: 3 | Status: SHIPPED | OUTPATIENT
Start: 2022-11-21

## 2023-02-07 ENCOUNTER — OFFICE VISIT (OUTPATIENT)
Dept: FAMILY MEDICINE CLINIC | Age: 63
End: 2023-02-07
Payer: COMMERCIAL

## 2023-02-07 VITALS
TEMPERATURE: 98.4 F | HEIGHT: 62 IN | RESPIRATION RATE: 16 BRPM | DIASTOLIC BLOOD PRESSURE: 71 MMHG | HEART RATE: 73 BPM | WEIGHT: 191 LBS | SYSTOLIC BLOOD PRESSURE: 111 MMHG | BODY MASS INDEX: 35.15 KG/M2 | OXYGEN SATURATION: 96 %

## 2023-02-07 DIAGNOSIS — Z00.00 WELL ADULT EXAM: Primary | ICD-10-CM

## 2023-02-07 DIAGNOSIS — I10 ESSENTIAL HYPERTENSION: ICD-10-CM

## 2023-02-07 PROCEDURE — 3074F SYST BP LT 130 MM HG: CPT | Performed by: NURSE PRACTITIONER

## 2023-02-07 PROCEDURE — 3078F DIAST BP <80 MM HG: CPT | Performed by: NURSE PRACTITIONER

## 2023-02-07 PROCEDURE — 99396 PREV VISIT EST AGE 40-64: CPT | Performed by: NURSE PRACTITIONER

## 2023-02-07 NOTE — PROGRESS NOTES
Chief Complaint   Patient presents with    Physical    Hypertension    Labs     Fasting today    Numbness     Hands bilat. Thumb pain     1. Have you been to the ER, urgent care clinic since your last visit? Hospitalized since your last visit? No    2. Have you seen or consulted any other health care providers outside of the [address] [place] [address] since your last visit? Include any pap smears or colon screening.  No

## 2023-02-08 LAB
ALBUMIN SERPL-MCNC: 4.5 G/DL (ref 3.8–4.8)
ALBUMIN/GLOB SERPL: 2 {RATIO} (ref 1.2–2.2)
ALP SERPL-CCNC: 101 IU/L (ref 44–121)
ALT SERPL-CCNC: 17 IU/L (ref 0–32)
AST SERPL-CCNC: 19 IU/L (ref 0–40)
BASOPHILS # BLD AUTO: 0.1 X10E3/UL (ref 0–0.2)
BASOPHILS NFR BLD AUTO: 1 %
BILIRUB SERPL-MCNC: <0.2 MG/DL (ref 0–1.2)
BUN SERPL-MCNC: 9 MG/DL (ref 8–27)
BUN/CREAT SERPL: 15 (ref 12–28)
CALCIUM SERPL-MCNC: 9.4 MG/DL (ref 8.7–10.3)
CHLORIDE SERPL-SCNC: 100 MMOL/L (ref 96–106)
CHOLEST SERPL-MCNC: 200 MG/DL (ref 100–199)
CO2 SERPL-SCNC: 22 MMOL/L (ref 20–29)
CREAT SERPL-MCNC: 0.6 MG/DL (ref 0.57–1)
EGFRCR SERPLBLD CKD-EPI 2021: 101 ML/MIN/1.73
EOSINOPHIL # BLD AUTO: 0.1 X10E3/UL (ref 0–0.4)
EOSINOPHIL NFR BLD AUTO: 2 %
ERYTHROCYTE [DISTWIDTH] IN BLOOD BY AUTOMATED COUNT: 12.8 % (ref 11.7–15.4)
GLOBULIN SER CALC-MCNC: 2.3 G/DL (ref 1.5–4.5)
GLUCOSE SERPL-MCNC: 103 MG/DL (ref 70–99)
HCT VFR BLD AUTO: 44 % (ref 34–46.6)
HDLC SERPL-MCNC: 80 MG/DL
HGB BLD-MCNC: 15 G/DL (ref 11.1–15.9)
IMM GRANULOCYTES # BLD AUTO: 0 X10E3/UL (ref 0–0.1)
IMM GRANULOCYTES NFR BLD AUTO: 0 %
IMP & REVIEW OF LAB RESULTS: NORMAL
LDLC SERPL CALC-MCNC: 108 MG/DL (ref 0–99)
LYMPHOCYTES # BLD AUTO: 2.4 X10E3/UL (ref 0.7–3.1)
LYMPHOCYTES NFR BLD AUTO: 30 %
MCH RBC QN AUTO: 32.8 PG (ref 26.6–33)
MCHC RBC AUTO-ENTMCNC: 34.1 G/DL (ref 31.5–35.7)
MCV RBC AUTO: 96 FL (ref 79–97)
MONOCYTES # BLD AUTO: 0.8 X10E3/UL (ref 0.1–0.9)
MONOCYTES NFR BLD AUTO: 10 %
NEUTROPHILS # BLD AUTO: 4.6 X10E3/UL (ref 1.4–7)
NEUTROPHILS NFR BLD AUTO: 57 %
PLATELET # BLD AUTO: 371 X10E3/UL (ref 150–450)
POTASSIUM SERPL-SCNC: 4.2 MMOL/L (ref 3.5–5.2)
PROT SERPL-MCNC: 6.8 G/DL (ref 6–8.5)
RBC # BLD AUTO: 4.57 X10E6/UL (ref 3.77–5.28)
SODIUM SERPL-SCNC: 137 MMOL/L (ref 134–144)
TRIGL SERPL-MCNC: 67 MG/DL (ref 0–149)
TSH SERPL DL<=0.005 MIU/L-ACNC: 2.2 UIU/ML (ref 0.45–4.5)
VLDLC SERPL CALC-MCNC: 12 MG/DL (ref 5–40)
WBC # BLD AUTO: 8 X10E3/UL (ref 3.4–10.8)

## 2023-02-20 NOTE — PROGRESS NOTES
Subjective:   60 y.o. female for Well Woman Check. Her gyne and breast care is done elsewhere by her Ob-Gyne physician. 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 60 Tablet 2    b complex vitamins tablet Take 1 Tab by mouth daily. melatonin 10 mg tab Take  by mouth. Cetirizine 10 mg cap Take  by mouth. TURMERIC ROOT EXTRACT PO Take  by mouth. psyllium (METAMUCIL SMOOTH TEXTURE) packet Take  by mouth two (2) times a day. omega-3 fatty acids-vitamin e 1,000 mg cap Take  by mouth. Family History   Problem Relation Age of Onset    Heart Disease Mother     Heart Disease Paternal Grandmother     Heart Disease Paternal Grandfather      Social History     Tobacco Use    Smoking status: Every Day     Packs/day: 0.50     Types: Cigarettes    Smokeless tobacco: Never   Substance Use Topics    Alcohol use: Yes             ROS: Feeling generally well. No TIA's or unusual headaches, no dysphagia. No prolonged cough. No dyspnea or chest pain on exertion. No abdominal pain, change in bowel habits, black or bloody stools. No urinary tract symptoms. No new or unusual musculoskeletal symptoms. Specific concerns today: having CTS stacey hands, wants to see Ortho. Objective:    The patient appears well, alert, oriented x 3, in no distress. Visit Vitals  /71   Pulse 73   Temp 98.4 °F (36.9 °C) (Oral)   Resp 16   Ht 5' 2\" (1.575 m)   Wt 191 lb (86.6 kg)   LMP 07/12/2010   SpO2 96%   BMI 34.93 kg/m²     ENT normal.  Neck supple. No adenopathy or thyromegaly. ISMA. Lungs are clear, good air entry, no wheezes, rhonchi or rales. S1 and S2 normal, no murmurs, regular rate and rhythm. Abdomen soft without tenderness, guarding, mass or organomegaly. Extremities show no edema, normal peripheral pulses. Neurological is normal, no focal findings. Breast and Pelvic exams are deferred. Assessment/Plan:   Well Woman  lose weight, increase physical activity, follow low fat diet, follow low salt diet, routine labs ordered  Encounter Diagnoses   Name Primary? Well adult exam Yes    Essential hypertension      Orders Placed This Encounter    CBC WITH AUTOMATED DIFF    METABOLIC PANEL, COMPREHENSIVE    TSH 3RD GENERATION    LIPID PANEL    CVD REPORT     Labs updated today  Follow up pending report  Will see Ortho for eval of CTS    I have discussed the diagnosis with the patient and the intended plan as seen in the above orders. The patient has received an after-visit summary and questions were answered concerning future plans. Patient conveyed understanding of the plan at the time of the visit.     Cameron Fung, MSN, ANP  2/20/2023

## 2023-10-22 DIAGNOSIS — R05.8 OTHER SPECIFIED COUGH: ICD-10-CM

## 2023-10-22 DIAGNOSIS — I10 ESSENTIAL (PRIMARY) HYPERTENSION: ICD-10-CM

## 2023-10-23 RX ORDER — AMLODIPINE AND VALSARTAN 10; 320 MG/1; MG/1
TABLET ORAL
Qty: 90 TABLET | Refills: 3 | Status: SHIPPED | OUTPATIENT
Start: 2023-10-23

## 2023-11-14 RX ORDER — HYDROCHLOROTHIAZIDE 12.5 MG/1
CAPSULE, GELATIN COATED ORAL
Qty: 90 CAPSULE | Refills: 3 | Status: SHIPPED | OUTPATIENT
Start: 2023-11-14

## 2023-11-14 RX ORDER — ESCITALOPRAM OXALATE 20 MG/1
TABLET ORAL
Qty: 90 TABLET | Refills: 3 | Status: SHIPPED | OUTPATIENT
Start: 2023-11-14

## 2024-03-19 ENCOUNTER — OFFICE VISIT (OUTPATIENT)
Age: 64
End: 2024-03-19
Payer: COMMERCIAL

## 2024-03-19 VITALS
HEIGHT: 62 IN | HEART RATE: 74 BPM | OXYGEN SATURATION: 95 % | TEMPERATURE: 98.7 F | DIASTOLIC BLOOD PRESSURE: 76 MMHG | BODY MASS INDEX: 41.07 KG/M2 | RESPIRATION RATE: 18 BRPM | SYSTOLIC BLOOD PRESSURE: 117 MMHG | WEIGHT: 223.2 LBS

## 2024-03-19 DIAGNOSIS — R00.2 PALPITATIONS: ICD-10-CM

## 2024-03-19 DIAGNOSIS — E66.01 OBESITY, CLASS III, BMI 40-49.9 (MORBID OBESITY) (HCC): ICD-10-CM

## 2024-03-19 DIAGNOSIS — I10 ESSENTIAL (PRIMARY) HYPERTENSION: ICD-10-CM

## 2024-03-19 DIAGNOSIS — Z00.00 WELL EXAM WITHOUT ABNORMAL FINDINGS OF PATIENT 18 YEARS OF AGE OR OLDER: Primary | ICD-10-CM

## 2024-03-19 PROCEDURE — 99396 PREV VISIT EST AGE 40-64: CPT | Performed by: NURSE PRACTITIONER

## 2024-03-19 PROCEDURE — 3078F DIAST BP <80 MM HG: CPT | Performed by: NURSE PRACTITIONER

## 2024-03-19 PROCEDURE — 3074F SYST BP LT 130 MM HG: CPT | Performed by: NURSE PRACTITIONER

## 2024-03-19 SDOH — ECONOMIC STABILITY: FOOD INSECURITY: WITHIN THE PAST 12 MONTHS, THE FOOD YOU BOUGHT JUST DIDN'T LAST AND YOU DIDN'T HAVE MONEY TO GET MORE.: NEVER TRUE

## 2024-03-19 SDOH — ECONOMIC STABILITY: HOUSING INSECURITY
IN THE LAST 12 MONTHS, WAS THERE A TIME WHEN YOU DID NOT HAVE A STEADY PLACE TO SLEEP OR SLEPT IN A SHELTER (INCLUDING NOW)?: NO

## 2024-03-19 SDOH — ECONOMIC STABILITY: FOOD INSECURITY: WITHIN THE PAST 12 MONTHS, YOU WORRIED THAT YOUR FOOD WOULD RUN OUT BEFORE YOU GOT MONEY TO BUY MORE.: NEVER TRUE

## 2024-03-19 SDOH — ECONOMIC STABILITY: INCOME INSECURITY: HOW HARD IS IT FOR YOU TO PAY FOR THE VERY BASICS LIKE FOOD, HOUSING, MEDICAL CARE, AND HEATING?: NOT HARD AT ALL

## 2024-03-19 ASSESSMENT — PATIENT HEALTH QUESTIONNAIRE - PHQ9
1. LITTLE INTEREST OR PLEASURE IN DOING THINGS: NOT AT ALL
SUM OF ALL RESPONSES TO PHQ QUESTIONS 1-9: 0
SUM OF ALL RESPONSES TO PHQ9 QUESTIONS 1 & 2: 0
SUM OF ALL RESPONSES TO PHQ QUESTIONS 1-9: 0
2. FEELING DOWN, DEPRESSED OR HOPELESS: NOT AT ALL

## 2024-03-19 NOTE — PROGRESS NOTES
Chief Complaint   Patient presents with    Annual Exam    Lab Collection     Patient is in the office today for a CPE and blood work.  Patient states she has chest heart beat fluttering that has been happening frequently.  No other concerns.    \"Have you been to the ER, urgent care clinic since your last visit?  Hospitalized since your last visit?\"    NO    “Have you seen or consulted any other health care providers outside of [facility] since your last visit?”    NO     “Have you had a pap smear?”    YES - Where: [facility]  Nurse/DAWOOD to request most recent records if not in the chart             “Have you had a colorectal cancer screening such as a colonoscopy/FIT/Cologuard?    YES - Type: colorectal specialist.     Date of last Colonoscopy: 12/27/2013  No cologuard on file  No FIT/FOBT on file   No flexible sigmoidoscopy on file         Click Here for Release of Records Request   
treated: Yes      HDL Cholesterol: 80 mg/dL      Total Cholesterol: 200 mg/dL    Immunization History   Administered Date(s) Administered    COVID-19, PFIZER PURPLE top, DILUTE for use, (age 12 y+), 30mcg/0.3mL 03/19/2021, 04/07/2021, 01/25/2022    Influenza Quadv 10/07/2015    Influenza Virus Vaccine 10/02/2020, 12/06/2021    Influenza, FLUARIX, FLULAVAL, FLUZONE (age 6 mo+) AND AFLURIA, (age 3 y+), PF, 0.5mL 11/05/2018, 10/04/2019    Pneumococcal, PCV-13, PREVNAR 13, (age 6w+), IM, 0.5mL 12/17/2020    TDaP, ADACEL (age 10y-64y), BOOSTRIX (age 10y+), IM, 0.5mL 01/20/2012    Zoster Live (Zostavax) 12/06/2021    Zoster Recombinant (Shingrix) 12/06/2021       Health Maintenance   Topic Date Due    HIV screen  Never done    Cervical cancer screen  Never done    Diabetes screen  Never done    Respiratory Syncytial Virus (RSV) Pregnant or age 60 yrs+ (1 - 1-dose 60+ series) Never done    Pneumococcal 0-64 years Vaccine (2 of 2 - PPSV23 or PCV20) 02/11/2021    DTaP/Tdap/Td vaccine (2 - Td or Tdap) 01/20/2022    Shingles vaccine (2 of 2) 01/31/2022    Flu vaccine (1) 08/01/2023    COVID-19 Vaccine (4 - 2023-24 season) 09/01/2023    Colorectal Cancer Screen  12/27/2023    Depression Screen  02/07/2024    Breast cancer screen  03/29/2024    Lipids  02/07/2028    Hepatitis C screen  Completed    Hepatitis A vaccine  Aged Out    Hepatitis B vaccine  Aged Out    Hib vaccine  Aged Out    Polio vaccine  Aged Out    Meningococcal (ACWY) vaccine  Aged Out       Assessment & Plan     Well exam without abnormal findings of patient 18 years of age or older  -     TSH; Future  -     Lipid Panel; Future  -     Comprehensive Metabolic Panel; Future  -     CBC with Auto Differential; Future  Essential (primary) hypertension  -     Comprehensive Metabolic Panel; Future  -     CBC with Auto Differential; Future  Obesity, Class III, BMI 40-49.9 (morbid obesity) (HCC)  Palpitations  -     [facility] - Ramon Osuna MD, Cardiology, [place]

## 2024-03-20 LAB
ALBUMIN SERPL-MCNC: 4.5 G/DL (ref 3.9–4.9)
ALBUMIN/GLOB SERPL: 2 {RATIO} (ref 1.2–2.2)
ALP SERPL-CCNC: 91 IU/L (ref 44–121)
ALT SERPL-CCNC: 20 IU/L (ref 0–32)
AST SERPL-CCNC: 23 IU/L (ref 0–40)
BASOPHILS # BLD AUTO: 0.1 X10E3/UL (ref 0–0.2)
BASOPHILS NFR BLD AUTO: 1 %
BILIRUB SERPL-MCNC: 0.4 MG/DL (ref 0–1.2)
BUN SERPL-MCNC: 10 MG/DL (ref 8–27)
BUN/CREAT SERPL: 16 (ref 12–28)
CALCIUM SERPL-MCNC: 9.2 MG/DL (ref 8.7–10.3)
CHLORIDE SERPL-SCNC: 99 MMOL/L (ref 96–106)
CHOLEST SERPL-MCNC: 216 MG/DL (ref 100–199)
CO2 SERPL-SCNC: 23 MMOL/L (ref 20–29)
CREAT SERPL-MCNC: 0.62 MG/DL (ref 0.57–1)
EGFRCR SERPLBLD CKD-EPI 2021: 99 ML/MIN/1.73
EOSINOPHIL # BLD AUTO: 0.1 X10E3/UL (ref 0–0.4)
EOSINOPHIL NFR BLD AUTO: 2 %
ERYTHROCYTE [DISTWIDTH] IN BLOOD BY AUTOMATED COUNT: 11.8 % (ref 11.7–15.4)
GLOBULIN SER CALC-MCNC: 2.3 G/DL (ref 1.5–4.5)
GLUCOSE SERPL-MCNC: 99 MG/DL (ref 70–99)
HCT VFR BLD AUTO: 43.3 % (ref 34–46.6)
HDLC SERPL-MCNC: 96 MG/DL
HGB BLD-MCNC: 14.7 G/DL (ref 11.1–15.9)
IMM GRANULOCYTES # BLD AUTO: 0 X10E3/UL (ref 0–0.1)
IMM GRANULOCYTES NFR BLD AUTO: 0 %
IMP & REVIEW OF LAB RESULTS: NORMAL
LDLC SERPL CALC-MCNC: 106 MG/DL (ref 0–99)
LYMPHOCYTES # BLD AUTO: 2.5 X10E3/UL (ref 0.7–3.1)
LYMPHOCYTES NFR BLD AUTO: 33 %
MCH RBC QN AUTO: 34 PG (ref 26.6–33)
MCHC RBC AUTO-ENTMCNC: 33.9 G/DL (ref 31.5–35.7)
MCV RBC AUTO: 100 FL (ref 79–97)
MONOCYTES # BLD AUTO: 0.7 X10E3/UL (ref 0.1–0.9)
MONOCYTES NFR BLD AUTO: 10 %
NEUTROPHILS # BLD AUTO: 4.2 X10E3/UL (ref 1.4–7)
NEUTROPHILS NFR BLD AUTO: 54 %
PLATELET # BLD AUTO: 341 X10E3/UL (ref 150–450)
POTASSIUM SERPL-SCNC: 4.1 MMOL/L (ref 3.5–5.2)
PROT SERPL-MCNC: 6.8 G/DL (ref 6–8.5)
RBC # BLD AUTO: 4.32 X10E6/UL (ref 3.77–5.28)
SODIUM SERPL-SCNC: 138 MMOL/L (ref 134–144)
TRIGL SERPL-MCNC: 78 MG/DL (ref 0–149)
TSH SERPL DL<=0.005 MIU/L-ACNC: 2.01 UIU/ML (ref 0.45–4.5)
VLDLC SERPL CALC-MCNC: 14 MG/DL (ref 5–40)
WBC # BLD AUTO: 7.6 X10E3/UL (ref 3.4–10.8)

## 2024-04-26 NOTE — PROGRESS NOTES
Anesthesia Evaluation     Patient summary reviewed and Nursing notes reviewed   history of anesthetic complications (sister & cousin h/o MH, pt had negative muscle biopsy):   NPO Solid Status: > 8 hours  NPO Liquid Status: > 2 hours           Airway   Mallampati: II  TM distance: >3 FB  Neck ROM: full  No difficulty expected  Dental - normal exam     Pulmonary - normal exam    breath sounds clear to auscultation  (+) ,sleep apnea on CPAP  Cardiovascular - normal exam    ECG reviewed  Rhythm: regular  Rate: normal    (+) hypertension    ROS comment: 1/24 Echo:  1. Normal pharmacologic stress test with no evidence of inducible ischemia.   2. Hyperdynamic LV systolic function (LVEF 76%).       Neuro/Psych- negative ROS  GI/Hepatic/Renal/Endo    (+) obesity (s/p LSG), GERD, liver disease fatty liver disease, diabetes mellitus type 2    Musculoskeletal     Abdominal    Substance History      OB/GYN          Other   arthritis,                 Anesthesia Plan    ASA 3     general with block     (Bilateral TAP blocks post-induction for post-operative analgesia per request of Dr. Rodriguez  )  intravenous induction     Anesthetic plan, risks, benefits, and alternatives have been provided, discussed and informed consent has been obtained with: patient.    Plan discussed with CRNA.    CODE STATUS:          Chief Complaint   Patient presents with    Diarrhea     x's 2-3  weeks     Pt seen in the office today for unresolved loose bm's. C/o of feeling weak. Has tried OTC Pepto bismol, Imodium . Reports 1 episode of vomiting r/l to abd cramping. Denies changes to appetite or hydration. Pt states that she does not feel poorly, just loose stool do not seem to resolve. Pt has had previous diverticulitis a few years ago, feels similar. Subjective: (As above and below)     Chief Complaint   Patient presents with    Diarrhea     x's 2-3  weeks     she is a 60y.o. year old female who presents for evaluation. Reviewed PmHx, RxHx, FmHx, SocHx, AllgHx and updated in chart. Review of Systems - negative except as listed above    Objective:     Vitals:    03/13/19 0823   BP: 121/73   Pulse: 71   Resp: 14   Temp: 98.7 °F (37.1 °C)   TempSrc: Oral   SpO2: 97%   Weight: 196 lb 12.8 oz (89.3 kg)   Height: 5' 2\" (1.575 m)     Physical Examination: General appearance - alert, well appearing, and in no distress  Mental status - normal mood, behavior, speech, dress, motor activity, and thought processes  Mouth - mucous membranes moist, pharynx normal without lesions  Chest - clear to auscultation, no wheezes, rales or rhonchi, symmetric air entry  Heart - normal rate, regular rhythm, normal S1, S2, no murmurs, rubs, clicks or gallops  Abdomen - tenderness noted diffusely across lower abdomen  Musculoskeletal - no joint tenderness, deformity or swelling  Extremities - peripheral pulses normal, no pedal edema, no clubbing or cyanosis    Assessment/ Plan:   1. Diarrhea, unspecified type  -take medication as written  - ciprofloxacin HCl (CIPRO) 500 mg tablet; Take 1 Tab by mouth two (2) times a day for 10 days. Dispense: 20 Tab; Refill: 0  - CT ABD PELV W CONT; Future    2.  Abdominal cramping  -check imaging  - CT ABD PELV W CONT; Future     Follow-up Disposition: As needed  I have discussed the diagnosis with the patient and the intended plan as seen in the above orders. The patient has received an after-visit summary and questions were answered concerning future plans.      Medication Side Effects and Warnings were discussed with patient: yes  Patient Labs were reviewed: yes  Patient Past Records were reviewed:  yes    Sayra Calderon M.D.

## 2024-06-24 DIAGNOSIS — K52.9 CHRONIC DIARRHEA: Primary | ICD-10-CM

## 2024-06-24 RX ORDER — DIPHENOXYLATE HYDROCHLORIDE AND ATROPINE SULFATE 2.5; .025 MG/1; MG/1
1 TABLET ORAL 4 TIMES DAILY PRN
Qty: 60 TABLET | Refills: 1 | Status: SHIPPED | OUTPATIENT
Start: 2024-06-24 | End: 2024-07-24

## 2024-07-22 ENCOUNTER — OFFICE VISIT (OUTPATIENT)
Age: 64
End: 2024-07-22
Payer: COMMERCIAL

## 2024-07-22 VITALS
HEART RATE: 87 BPM | SYSTOLIC BLOOD PRESSURE: 120 MMHG | DIASTOLIC BLOOD PRESSURE: 78 MMHG | OXYGEN SATURATION: 96 % | RESPIRATION RATE: 19 BRPM | BODY MASS INDEX: 32.57 KG/M2 | WEIGHT: 177 LBS | TEMPERATURE: 98.6 F | HEIGHT: 62 IN

## 2024-07-22 DIAGNOSIS — A05.9 FOOD POISONING: Primary | ICD-10-CM

## 2024-07-22 PROCEDURE — 3078F DIAST BP <80 MM HG: CPT | Performed by: NURSE PRACTITIONER

## 2024-07-22 PROCEDURE — 3074F SYST BP LT 130 MM HG: CPT | Performed by: NURSE PRACTITIONER

## 2024-07-22 PROCEDURE — 99213 OFFICE O/P EST LOW 20 MIN: CPT | Performed by: NURSE PRACTITIONER

## 2024-07-22 RX ORDER — METRONIDAZOLE 500 MG/1
500 TABLET ORAL 2 TIMES DAILY
Qty: 14 TABLET | Refills: 0 | Status: SHIPPED | OUTPATIENT
Start: 2024-07-22 | End: 2024-07-29

## 2024-07-22 RX ORDER — LEVOCETIRIZINE DIHYDROCHLORIDE 5 MG/1
5 TABLET, FILM COATED ORAL NIGHTLY
COMMUNITY

## 2024-07-22 RX ORDER — CIPROFLOXACIN 500 MG/1
500 TABLET, FILM COATED ORAL 2 TIMES DAILY
Qty: 14 TABLET | Refills: 0 | Status: SHIPPED | OUTPATIENT
Start: 2024-07-22 | End: 2024-07-29

## 2024-07-22 RX ORDER — CYCLOSPORINE 0 G/ML
SOLUTION/ DROPS OPHTHALMIC; TOPICAL
COMMUNITY

## 2024-07-22 NOTE — PROGRESS NOTES
Chief Complaint   Patient presents with    Diarrhea     Patient is in the office today for diarrhea issues for 3 weeks.  Patient stated that lomotil and it helps but then the diarrhea comes back.   Patient stated that the bowel movents have been loose and and soft.   Patient stated that she has stomach gurgling, when it happens.   No other concerns.    \"Have you been to the ER, urgent care clinic since your last visit?  Hospitalized since your last visit?\"    NO    “Have you seen or consulted any other health care providers outside of [facility] since your last visit?”    NO    Have you had a mammogram?”   YES - Where: Dr. Ko Nurse/[facility] to request most recent records if not in the chart    Date of last Mammogram: 3/29/2022      “Have you had a pap smear?”    YES - Where: Dr. Ko Nurse/[facility] to request most recent records if not in the chart    No cervical cancer screening on file         “Have you had a colorectal cancer screening such as a colonoscopy/FIT/Cologuard?    Colorectal specialist Jan [id number].    Date of last Colonoscopy: 12/27/2013  No cologuard on file  No FIT/FOBT on file   No flexible sigmoidoscopy on file         Click Here for Release of Records Request

## 2024-07-22 NOTE — PROGRESS NOTES
Ericka Baer (:  1960) is a 63 y.o. female, Established patient, here for evaluation of the following chief complaint(s):  Diarrhea         Assessment & Plan  1. Chronic diarrhea.  A prescription for Cipro and Flagyl, to be taken twice daily with breakfast and dinner, has been issued. The patient has been advised to consume probiotics such as Florastor or Culturelle for the ensuing week. She has been advised to abstain from alcohol while on Flagyl. A bland diet, particularly dairy, has been recommended.    Results    1. Food poisoning  -     ciprofloxacin (CIPRO) 500 MG tablet; Take 1 tablet by mouth 2 times daily for 7 days, Disp-14 tablet, R-0Normal  -     metroNIDAZOLE (FLAGYL) 500 MG tablet; Take 1 tablet by mouth 2 times daily for 7 days, Disp-14 tablet, R-0Normal    No follow-ups on file.       Subjective   History of Present Illness  The patient is a 63-year-old female presenting for chronic diarrhea for 3 weeks.    The patient denies experiencing any nausea or vomiting associated with the diarrhea. The onset of the diarrhea was sudden, with no preceding factors such as increased food intake or recent vacation. Her diet primarily consists of taisha cooking and not dining out, often consisting of salad. She was diagnosed with diverticulitis a few years ago, and in her early 20s, she was diagnosed with Campylobacter infection. She reports occasional abdominal pain during defecation. Despite attempts at dietary modifications, including cottage cheese and a bland diet, her symptoms have not improved. She denies any presence of blood in her stool.   She drinks beer every day.   She is not allergic to any antibiotics.    Review of Systems   A comprehensive review of system was obtained and negative except findings in the HPI      Objective   Blood pressure 120/78, pulse 87, temperature 98.6 °F (37 °C), temperature source Oral, resp. rate 19, height 1.575 m (5' 2\"), weight 80.3 kg (177 lb), SpO2 96

## 2024-08-22 DIAGNOSIS — K52.9 CHRONIC DIARRHEA: ICD-10-CM

## 2024-08-22 RX ORDER — DIPHENOXYLATE HYDROCHLORIDE AND ATROPINE SULFATE 2.5; .025 MG/1; MG/1
TABLET ORAL
Qty: 60 TABLET | Refills: 1 | Status: SHIPPED | OUTPATIENT
Start: 2024-08-22 | End: 2024-09-21

## 2024-10-11 DIAGNOSIS — R05.8 OTHER SPECIFIED COUGH: ICD-10-CM

## 2024-10-11 DIAGNOSIS — I10 ESSENTIAL (PRIMARY) HYPERTENSION: ICD-10-CM

## 2024-10-13 RX ORDER — AMLODIPINE AND VALSARTAN 10; 320 MG/1; MG/1
TABLET ORAL
Qty: 90 TABLET | Refills: 3 | Status: SHIPPED | OUTPATIENT
Start: 2024-10-13

## 2024-11-07 RX ORDER — HYDROCHLOROTHIAZIDE 12.5 MG/1
CAPSULE ORAL
Qty: 90 CAPSULE | Refills: 3 | Status: SHIPPED | OUTPATIENT
Start: 2024-11-07

## 2024-11-07 RX ORDER — ESCITALOPRAM OXALATE 20 MG/1
TABLET ORAL
Qty: 90 TABLET | Refills: 3 | Status: SHIPPED | OUTPATIENT
Start: 2024-11-07

## 2024-11-15 DIAGNOSIS — I10 ESSENTIAL (PRIMARY) HYPERTENSION: ICD-10-CM

## 2024-11-15 DIAGNOSIS — R05.8 OTHER SPECIFIED COUGH: ICD-10-CM

## 2024-11-15 RX ORDER — AMLODIPINE AND VALSARTAN 10; 320 MG/1; MG/1
1 TABLET ORAL DAILY
Qty: 90 TABLET | Refills: 0 | Status: SHIPPED | OUTPATIENT
Start: 2024-11-15

## 2025-02-13 ENCOUNTER — COMMUNITY OUTREACH (OUTPATIENT)
Facility: CLINIC | Age: 65
End: 2025-02-13

## 2025-02-13 NOTE — PROGRESS NOTES
Patient's  shows they are overdue for Mammogram and Colorectal Screening.   Care Everywhere and  files searched.   updated with 2013 colonoscopy.     Fax request sent to [facility] and requested 2024 mammogram from [facility] [place].

## 2025-03-08 DIAGNOSIS — R05.8 OTHER SPECIFIED COUGH: ICD-10-CM

## 2025-03-08 DIAGNOSIS — I10 ESSENTIAL (PRIMARY) HYPERTENSION: ICD-10-CM

## 2025-03-10 RX ORDER — AMLODIPINE AND VALSARTAN 10; 320 MG/1; MG/1
1 TABLET ORAL DAILY
Qty: 90 TABLET | Refills: 3 | Status: SHIPPED | OUTPATIENT
Start: 2025-03-10

## 2025-04-08 ENCOUNTER — OFFICE VISIT (OUTPATIENT)
Facility: CLINIC | Age: 65
End: 2025-04-08
Payer: MEDICARE

## 2025-04-08 VITALS
SYSTOLIC BLOOD PRESSURE: 120 MMHG | DIASTOLIC BLOOD PRESSURE: 70 MMHG | HEIGHT: 62 IN | OXYGEN SATURATION: 97 % | TEMPERATURE: 97.8 F | WEIGHT: 183 LBS | HEART RATE: 74 BPM | BODY MASS INDEX: 33.68 KG/M2

## 2025-04-08 DIAGNOSIS — E66.813 OBESITY, CLASS III, BMI 40-49.9 (MORBID OBESITY): ICD-10-CM

## 2025-04-08 DIAGNOSIS — Z00.00 WELL EXAM WITHOUT ABNORMAL FINDINGS OF PATIENT 18 YEARS OF AGE OR OLDER: Primary | ICD-10-CM

## 2025-04-08 DIAGNOSIS — Z12.31 SCREENING MAMMOGRAM FOR BREAST CANCER: ICD-10-CM

## 2025-04-08 DIAGNOSIS — I10 ESSENTIAL (PRIMARY) HYPERTENSION: ICD-10-CM

## 2025-04-08 PROCEDURE — G8427 DOCREV CUR MEDS BY ELIG CLIN: HCPCS | Performed by: NURSE PRACTITIONER

## 2025-04-08 PROCEDURE — G8400 PT W/DXA NO RESULTS DOC: HCPCS | Performed by: NURSE PRACTITIONER

## 2025-04-08 PROCEDURE — G8417 CALC BMI ABV UP PARAM F/U: HCPCS | Performed by: NURSE PRACTITIONER

## 2025-04-08 PROCEDURE — 1123F ACP DISCUSS/DSCN MKR DOCD: CPT | Performed by: NURSE PRACTITIONER

## 2025-04-08 PROCEDURE — 99213 OFFICE O/P EST LOW 20 MIN: CPT | Performed by: NURSE PRACTITIONER

## 2025-04-08 PROCEDURE — 1090F PRES/ABSN URINE INCON ASSESS: CPT | Performed by: NURSE PRACTITIONER

## 2025-04-08 PROCEDURE — 3074F SYST BP LT 130 MM HG: CPT | Performed by: NURSE PRACTITIONER

## 2025-04-08 PROCEDURE — 3078F DIAST BP <80 MM HG: CPT | Performed by: NURSE PRACTITIONER

## 2025-04-08 PROCEDURE — G0402 INITIAL PREVENTIVE EXAM: HCPCS | Performed by: NURSE PRACTITIONER

## 2025-04-08 PROCEDURE — 4004F PT TOBACCO SCREEN RCVD TLK: CPT | Performed by: NURSE PRACTITIONER

## 2025-04-08 PROCEDURE — 3017F COLORECTAL CA SCREEN DOC REV: CPT | Performed by: NURSE PRACTITIONER

## 2025-04-08 RX ORDER — CALCIUM CARBONATE 500(1250)
500 TABLET ORAL DAILY
COMMUNITY

## 2025-04-08 RX ORDER — CETIRIZINE HYDROCHLORIDE 10 MG/1
10 TABLET ORAL DAILY
COMMUNITY

## 2025-04-08 SDOH — ECONOMIC STABILITY: FOOD INSECURITY: WITHIN THE PAST 12 MONTHS, THE FOOD YOU BOUGHT JUST DIDN'T LAST AND YOU DIDN'T HAVE MONEY TO GET MORE.: NEVER TRUE

## 2025-04-08 SDOH — ECONOMIC STABILITY: FOOD INSECURITY: WITHIN THE PAST 12 MONTHS, YOU WORRIED THAT YOUR FOOD WOULD RUN OUT BEFORE YOU GOT MONEY TO BUY MORE.: NEVER TRUE

## 2025-04-08 ASSESSMENT — LIFESTYLE VARIABLES
HOW OFTEN DURING THE LAST YEAR HAVE YOU BEEN UNABLE TO REMEMBER WHAT HAPPENED THE NIGHT BEFORE BECAUSE YOU HAD BEEN DRINKING: NEVER
HOW OFTEN DURING THE LAST YEAR HAVE YOU HAD A FEELING OF GUILT OR REMORSE AFTER DRINKING: NEVER
HOW OFTEN DURING THE LAST YEAR HAVE YOU FOUND THAT YOU WERE NOT ABLE TO STOP DRINKING ONCE YOU HAD STARTED: NEVER
HOW OFTEN DURING THE LAST YEAR HAVE YOU FAILED TO DO WHAT WAS NORMALLY EXPECTED FROM YOU BECAUSE OF DRINKING: NEVER
HAS A RELATIVE, FRIEND, DOCTOR, OR ANOTHER HEALTH PROFESSIONAL EXPRESSED CONCERN ABOUT YOUR DRINKING OR SUGGESTED YOU CUT DOWN: NO
HOW MANY STANDARD DRINKS CONTAINING ALCOHOL DO YOU HAVE ON A TYPICAL DAY: 5 OR 6
HAVE YOU OR SOMEONE ELSE BEEN INJURED AS A RESULT OF YOUR DRINKING: NO
HOW OFTEN DO YOU HAVE A DRINK CONTAINING ALCOHOL: 4 OR MORE TIMES A WEEK
HOW OFTEN DURING THE LAST YEAR HAVE YOU NEEDED AN ALCOHOLIC DRINK FIRST THING IN THE MORNING TO GET YOURSELF GOING AFTER A NIGHT OF HEAVY DRINKING: NEVER

## 2025-04-08 ASSESSMENT — PATIENT HEALTH QUESTIONNAIRE - PHQ9
SUM OF ALL RESPONSES TO PHQ QUESTIONS 1-9: 0
1. LITTLE INTEREST OR PLEASURE IN DOING THINGS: NOT AT ALL
SUM OF ALL RESPONSES TO PHQ QUESTIONS 1-9: 0
2. FEELING DOWN, DEPRESSED OR HOPELESS: NOT AT ALL

## 2025-04-08 ASSESSMENT — VISUAL ACUITY
OD_CC: 20/25
OS_CC: 20/25

## 2025-04-08 NOTE — PROGRESS NOTES
Chief Complaint   Patient presents with    Medicare AWV     Welcome to Medicare     \"Have you been to the ER, urgent care clinic since your last visit?  Hospitalized since your last visit?\"    NO    “Have you seen or consulted any other health care providers outside of [facility] since your last visit?”    NO     /70 (BP Site: Left Upper Arm, Patient Position: Sitting)   Pulse 74   Temp 97.8 °F (36.6 °C) (Temporal)   Ht 1.575 m (5' 2\")   Wt 83 kg (183 lb)   SpO2 97%   BMI 33.47 kg/m²      PHQ-9 Total Score: 0 (2025  7:56 AM)       Patient-Entered Cms Msp: Part I       Question 2025 10:57 AM EDT - Filed by Patient    Medicare requires that we periodically ask the following questions.       Are you receiving Black Lung (BL) benefits? No    Are the services to be paid by a government research program? No    Are you entitled to benefits through the Department of Veterans Affairs (DVA)? No    Was the illness/injury due to a work-related accident/condition? No    Was the illness/injury due to a non-work-related accident? No    Are you entitled to Medicare based on:     Age? Yes    End-stage renal disease (ESRD)? No            Patient-Entered Msp: Age-Disability-Esrd Primary Branch Calculation (range: 0 - 5) 1    Are you currently employed? No, Retired    Date of snf: 2016    Do you have a spouse who is currently employed? No, Retired    Date of snf: 2017    Thank you for answering this questionnaire.                 “Have you had a pap smear?”    NO    No cervical cancer screening on file       “Have you had a colorectal cancer screening such as a colonoscopy/FIT/Cologuard?    NO    Date of last Colonoscopy: 2013  No cologuard on file  No FIT/FOBT on file   No flexible sigmoidoscopy on file           Click Here for Release of Records Request     Identified Patient with 2 Patient Identifiers-Name and

## 2025-04-09 LAB
ALBUMIN SERPL-MCNC: 4.4 G/DL (ref 3.9–4.9)
ALP SERPL-CCNC: 78 IU/L (ref 44–121)
ALT SERPL-CCNC: 20 IU/L (ref 0–32)
AST SERPL-CCNC: 22 IU/L (ref 0–40)
BASOPHILS # BLD AUTO: 0.1 X10E3/UL (ref 0–0.2)
BASOPHILS NFR BLD AUTO: 1 %
BILIRUB SERPL-MCNC: 0.2 MG/DL (ref 0–1.2)
BUN SERPL-MCNC: 11 MG/DL (ref 8–27)
BUN/CREAT SERPL: 21 (ref 12–28)
CALCIUM SERPL-MCNC: 9 MG/DL (ref 8.7–10.3)
CHLORIDE SERPL-SCNC: 102 MMOL/L (ref 96–106)
CHOLEST SERPL-MCNC: 226 MG/DL (ref 100–199)
CO2 SERPL-SCNC: 24 MMOL/L (ref 20–29)
CREAT SERPL-MCNC: 0.53 MG/DL (ref 0.57–1)
EGFRCR SERPLBLD CKD-EPI 2021: 103 ML/MIN/1.73
EOSINOPHIL # BLD AUTO: 0.2 X10E3/UL (ref 0–0.4)
EOSINOPHIL NFR BLD AUTO: 2 %
ERYTHROCYTE [DISTWIDTH] IN BLOOD BY AUTOMATED COUNT: 11.7 % (ref 11.7–15.4)
GLOBULIN SER CALC-MCNC: 2.2 G/DL (ref 1.5–4.5)
GLUCOSE SERPL-MCNC: 97 MG/DL (ref 70–99)
HBA1C MFR BLD: 5.1 % (ref 4.8–5.6)
HCT VFR BLD AUTO: 40.5 % (ref 34–46.6)
HDLC SERPL-MCNC: 95 MG/DL
HGB BLD-MCNC: 13.9 G/DL (ref 11.1–15.9)
IMM GRANULOCYTES # BLD AUTO: 0 X10E3/UL (ref 0–0.1)
IMM GRANULOCYTES NFR BLD AUTO: 0 %
LDLC SERPL CALC-MCNC: 121 MG/DL (ref 0–99)
LYMPHOCYTES # BLD AUTO: 2.5 X10E3/UL (ref 0.7–3.1)
LYMPHOCYTES NFR BLD AUTO: 33 %
MCH RBC QN AUTO: 34.4 PG (ref 26.6–33)
MCHC RBC AUTO-ENTMCNC: 34.3 G/DL (ref 31.5–35.7)
MCV RBC AUTO: 100 FL (ref 79–97)
MONOCYTES # BLD AUTO: 0.8 X10E3/UL (ref 0.1–0.9)
MONOCYTES NFR BLD AUTO: 11 %
NEUTROPHILS # BLD AUTO: 3.9 X10E3/UL (ref 1.4–7)
NEUTROPHILS NFR BLD AUTO: 53 %
PLATELET # BLD AUTO: 314 X10E3/UL (ref 150–450)
POTASSIUM SERPL-SCNC: 4.2 MMOL/L (ref 3.5–5.2)
PROT SERPL-MCNC: 6.6 G/DL (ref 6–8.5)
RBC # BLD AUTO: 4.04 X10E6/UL (ref 3.77–5.28)
SODIUM SERPL-SCNC: 140 MMOL/L (ref 134–144)
TRIGL SERPL-MCNC: 59 MG/DL (ref 0–149)
VLDLC SERPL CALC-MCNC: 10 MG/DL (ref 5–40)
WBC # BLD AUTO: 7.4 X10E3/UL (ref 3.4–10.8)

## 2025-04-11 NOTE — PROGRESS NOTES
Medicare Annual Wellness Visit    Ericka Baer is here for Medicare AWV (Welcome to Medicare)    Assessment & Plan    Well exam without abnormal findings of patient 18 years of age or older  -     Lipid Panel  -     Comprehensive Metabolic Panel  -     CBC with Auto Differential  -     Hemoglobin A1C  Essential (primary) hypertension  -     Lipid Panel  -     Comprehensive Metabolic Panel  -     CBC with Auto Differential  Obesity, Class III, BMI 40-49.9 (morbid obesity)  -     Hemoglobin A1C  Screening mammogram for breast cancer  -     FRANKI DIGITAL SCREEN W OR WO CAD BILATERAL; Future    Results  All labs updated today  No refills needed at this time  Updated mammogram order  Follow up 1 year if stable     No follow-ups on file.     Subjective     History of Present Illness  The following acute and/or chronic problems were also addressed today:  She is managed on exforge for bp and lexapro for mood  Both stable, no adr/se of med  Needs order for mammogram    Patient's complete Health Risk Assessment and screening values have been reviewed and are found in Flowsheets. The following problems were reviewed today and where indicated follow up appointments were made and/or referrals ordered.    Positive Risk Factor Screenings with Interventions:       Alcohol Screening:  AUDIT-C Score: 8  AUDIT Total Score: 8  Total Score Interpretation: 8-12 suggests harmful or hazardous alcohol consumption in women     Interventions:  Patient declined any further intervention or treatment        Drug Use:   DAST-10 Score: 0       Interventions:  Patient declined any further intervention or treatment           Abnormal BMI (obese):  Body mass index is 33.47 kg/m². (!) Abnormal    Interventions:  Patient declines any further evaluation or treatment          Vision Screen:  Visual Acuity screen is abnormal due to a score of 20/25 or worse.  Interventions:   Patient declines any further evaluation or treatment       Tobacco Use:

## 2025-04-11 NOTE — PATIENT INSTRUCTIONS
9 Ways to Cut Back on Drinking  Maybe you've found yourself drinking more alcohol than you'd prefer. If you want to cut back, here are some ideas to try.    Think before you drink.  Do you really want a drink, or is it just a habit? If you're used to having a drink at a certain time, try doing something else then.     Look for substitutes.  Find some no-alcohol drinks that you enjoy, like flavored seltzer water, tea with honey, or tonic with a slice of lime. Or try alcohol-free beer or \"virgin\" cocktails (without the alcohol).     Drink more water.  Use water to quench your thirst. Drink a glass of water before you have any alcohol. Have another glass along with every drink or between drinks.     Shrink your drink.  For example, have a bottle of beer instead of a pint. Use a smaller glass for wine. Choose drinks with lower alcohol content (ABV%). Or use less liquor and more mixer in cocktails.     Slow down.  It's easy to drink quickly and without thinking about it. Pay attention, and make each drink last longer.     Do the math.  Total up how much you spend on alcohol each month. How much is that a year? If you cut back, what could you do with the money you save?     Take a break.  Choose a day or two each week when you won't drink at all. Notice how you feel on those days, physically and emotionally. How did you sleep? Do you feel better? Over time, add more break days.     Count calories.  Would you like to lose some weight? For some people that's a good motivator for cutting back. Figure out how many calories are in each drink. How many does that add up to in a day? In a week? In a month?     Practice saying no.  Be ready when someone offers you a drink. Try: \"Thanks, I've had enough.\" Or \"Thanks, but I'm cutting back.\" Or \"No, thanks. I feel better when I drink less.\"   Current as of: August 20, 2024  Content Version: 14.4  © 8310-1920 [employer] [facility].   Care instructions adapted under license by

## 2025-04-15 ENCOUNTER — RESULTS FOLLOW-UP (OUTPATIENT)
Facility: CLINIC | Age: 65
End: 2025-04-15